# Patient Record
Sex: FEMALE | Race: WHITE | NOT HISPANIC OR LATINO | Employment: UNEMPLOYED | URBAN - METROPOLITAN AREA
[De-identification: names, ages, dates, MRNs, and addresses within clinical notes are randomized per-mention and may not be internally consistent; named-entity substitution may affect disease eponyms.]

---

## 2019-01-01 ENCOUNTER — INITIAL CONSULT (OUTPATIENT)
Dept: HEMATOLOGY/ONCOLOGY | Facility: CLINIC | Age: 79
End: 2019-01-01
Payer: MEDICARE

## 2019-01-01 ENCOUNTER — HOSPITAL ENCOUNTER (INPATIENT)
Facility: HOSPITAL | Age: 79
LOS: 5 days | Discharge: HOME HEALTH | DRG: 025 | End: 2019-03-13
Attending: EMERGENCY MEDICINE | Admitting: NEUROLOGICAL SURGERY
Payer: MEDICARE

## 2019-01-01 ENCOUNTER — TELEPHONE (OUTPATIENT)
Dept: NEUROSURGERY | Facility: CLINIC | Age: 79
End: 2019-01-01

## 2019-01-01 ENCOUNTER — DOCUMENTATION ONLY (OUTPATIENT)
Dept: RESEARCH | Facility: HOSPITAL | Age: 79
End: 2019-01-01

## 2019-01-01 ENCOUNTER — NURSE TRIAGE (OUTPATIENT)
Dept: ADMINISTRATIVE | Facility: CLINIC | Age: 79
End: 2019-01-01

## 2019-01-01 ENCOUNTER — TELEPHONE (OUTPATIENT)
Dept: ADMINISTRATIVE | Facility: CLINIC | Age: 79
End: 2019-01-01

## 2019-01-01 ENCOUNTER — INITIAL CONSULT (OUTPATIENT)
Dept: RADIATION ONCOLOGY | Facility: CLINIC | Age: 79
End: 2019-01-01
Payer: MEDICARE

## 2019-01-01 ENCOUNTER — ANESTHESIA EVENT (OUTPATIENT)
Dept: SURGERY | Facility: HOSPITAL | Age: 79
DRG: 025 | End: 2019-01-01
Payer: MEDICARE

## 2019-01-01 ENCOUNTER — PATIENT OUTREACH (OUTPATIENT)
Dept: ADMINISTRATIVE | Facility: CLINIC | Age: 79
End: 2019-01-01

## 2019-01-01 ENCOUNTER — ANESTHESIA (OUTPATIENT)
Dept: SURGERY | Facility: HOSPITAL | Age: 79
DRG: 025 | End: 2019-01-01
Payer: MEDICARE

## 2019-01-01 ENCOUNTER — OFFICE VISIT (OUTPATIENT)
Dept: NEUROSURGERY | Facility: CLINIC | Age: 79
End: 2019-01-01
Payer: MEDICARE

## 2019-01-01 VITALS
BODY MASS INDEX: 33.68 KG/M2 | DIASTOLIC BLOOD PRESSURE: 68 MMHG | HEIGHT: 62 IN | HEART RATE: 68 BPM | WEIGHT: 183 LBS | SYSTOLIC BLOOD PRESSURE: 120 MMHG

## 2019-01-01 VITALS
DIASTOLIC BLOOD PRESSURE: 83 MMHG | HEART RATE: 69 BPM | WEIGHT: 183.63 LBS | RESPIRATION RATE: 18 BRPM | BODY MASS INDEX: 33.79 KG/M2 | OXYGEN SATURATION: 99 % | SYSTOLIC BLOOD PRESSURE: 142 MMHG | TEMPERATURE: 97 F | HEIGHT: 62 IN

## 2019-01-01 VITALS
WEIGHT: 183 LBS | RESPIRATION RATE: 18 BRPM | BODY MASS INDEX: 33.68 KG/M2 | DIASTOLIC BLOOD PRESSURE: 68 MMHG | SYSTOLIC BLOOD PRESSURE: 120 MMHG | HEART RATE: 68 BPM | HEIGHT: 62 IN

## 2019-01-01 DIAGNOSIS — E03.9 HYPOTHYROIDISM, UNSPECIFIED TYPE: ICD-10-CM

## 2019-01-01 DIAGNOSIS — R73.9 HYPERGLYCEMIA: ICD-10-CM

## 2019-01-01 DIAGNOSIS — G93.89 BRAIN MASS: Primary | ICD-10-CM

## 2019-01-01 DIAGNOSIS — C71.9 GBM (GLIOBLASTOMA MULTIFORME): ICD-10-CM

## 2019-01-01 DIAGNOSIS — C71.1 PRIMARY MALIGNANT NEOPLASM OF FRONTAL LOBE: Primary | ICD-10-CM

## 2019-01-01 DIAGNOSIS — I10 HYPERTENSION, UNSPECIFIED TYPE: ICD-10-CM

## 2019-01-01 DIAGNOSIS — I48.91 A-FIB: ICD-10-CM

## 2019-01-01 DIAGNOSIS — C71.9 GLIOBLASTOMA MULTIFORME: Primary | ICD-10-CM

## 2019-01-01 DIAGNOSIS — Z98.890 S/P CRANIOTOMY: ICD-10-CM

## 2019-01-01 DIAGNOSIS — R41.82 ALTERED MENTAL STATUS, UNSPECIFIED ALTERED MENTAL STATUS TYPE: ICD-10-CM

## 2019-01-01 LAB
ABO + RH BLD: NORMAL
ALBUMIN SERPL BCP-MCNC: 3.1 G/DL
ALBUMIN SERPL BCP-MCNC: 3.3 G/DL
ALP SERPL-CCNC: 43 U/L
ALP SERPL-CCNC: 43 U/L
ALT SERPL W/O P-5'-P-CCNC: 42 U/L
ALT SERPL W/O P-5'-P-CCNC: 59 U/L
ANION GAP SERPL CALC-SCNC: 11 MMOL/L
ANION GAP SERPL CALC-SCNC: 11 MMOL/L
ANION GAP SERPL CALC-SCNC: 12 MMOL/L
ANION GAP SERPL CALC-SCNC: 13 MMOL/L
ANION GAP SERPL CALC-SCNC: 8 MMOL/L
ANION GAP SERPL CALC-SCNC: 9 MMOL/L
ANION GAP SERPL CALC-SCNC: 9 MMOL/L
APTT BLDCRRT: 22 SEC
APTT BLDCRRT: 24 SEC
AST SERPL-CCNC: 20 U/L
AST SERPL-CCNC: 43 U/L
BASOPHILS # BLD AUTO: 0.01 K/UL
BASOPHILS # BLD AUTO: 0.03 K/UL
BASOPHILS # BLD AUTO: 0.04 K/UL
BASOPHILS # BLD AUTO: 0.05 K/UL
BASOPHILS # BLD AUTO: 0.05 K/UL
BASOPHILS # BLD AUTO: 0.06 K/UL
BASOPHILS NFR BLD: 0.1 %
BASOPHILS NFR BLD: 0.2 %
BASOPHILS NFR BLD: 0.4 %
BASOPHILS NFR BLD: 0.6 %
BASOPHILS NFR BLD: 0.6 %
BASOPHILS NFR BLD: 0.7 %
BILIRUB SERPL-MCNC: 0.3 MG/DL
BILIRUB SERPL-MCNC: 0.4 MG/DL
BILIRUB UR QL STRIP: NEGATIVE
BILIRUB UR QL STRIP: NEGATIVE
BLD GP AB SCN CELLS X3 SERPL QL: NORMAL
BLD PROD TYP BPU: NORMAL
BLD PROD TYP BPU: NORMAL
BLOOD UNIT EXPIRATION DATE: NORMAL
BLOOD UNIT EXPIRATION DATE: NORMAL
BLOOD UNIT TYPE CODE: 600
BLOOD UNIT TYPE CODE: 600
BLOOD UNIT TYPE: NORMAL
BLOOD UNIT TYPE: NORMAL
BUN SERPL-MCNC: 24 MG/DL
BUN SERPL-MCNC: 29 MG/DL
BUN SERPL-MCNC: 30 MG/DL
BUN SERPL-MCNC: 30 MG/DL
BUN SERPL-MCNC: 31 MG/DL
BUN SERPL-MCNC: 32 MG/DL
BUN SERPL-MCNC: 41 MG/DL
CALCIUM SERPL-MCNC: 10 MG/DL
CALCIUM SERPL-MCNC: 10.3 MG/DL
CALCIUM SERPL-MCNC: 10.5 MG/DL
CALCIUM SERPL-MCNC: 9.3 MG/DL
CALCIUM SERPL-MCNC: 9.3 MG/DL
CHLORIDE SERPL-SCNC: 100 MMOL/L
CHLORIDE SERPL-SCNC: 101 MMOL/L
CHLORIDE SERPL-SCNC: 102 MMOL/L
CHLORIDE SERPL-SCNC: 104 MMOL/L
CHLORIDE SERPL-SCNC: 104 MMOL/L
CHLORIDE SERPL-SCNC: 106 MMOL/L
CHLORIDE SERPL-SCNC: 106 MMOL/L
CLARITY UR REFRACT.AUTO: CLEAR
CLARITY UR REFRACT.AUTO: CLEAR
CO2 SERPL-SCNC: 21 MMOL/L
CO2 SERPL-SCNC: 22 MMOL/L
CO2 SERPL-SCNC: 22 MMOL/L
CO2 SERPL-SCNC: 24 MMOL/L
CO2 SERPL-SCNC: 25 MMOL/L
CO2 SERPL-SCNC: 25 MMOL/L
CO2 SERPL-SCNC: 26 MMOL/L
CODING SYSTEM: NORMAL
CODING SYSTEM: NORMAL
COLOR UR AUTO: ABNORMAL
COLOR UR AUTO: YELLOW
CREAT SERPL-MCNC: 1.2 MG/DL
CREAT SERPL-MCNC: 1.2 MG/DL
CREAT SERPL-MCNC: 1.3 MG/DL
CREAT SERPL-MCNC: 1.3 MG/DL
CREAT SERPL-MCNC: 1.4 MG/DL
CREAT SERPL-MCNC: 1.4 MG/DL
CREAT SERPL-MCNC: 1.5 MG/DL
DIFFERENTIAL METHOD: ABNORMAL
DISPENSE STATUS: NORMAL
DISPENSE STATUS: NORMAL
EOSINOPHIL # BLD AUTO: 0 K/UL
EOSINOPHIL # BLD AUTO: 0 K/UL
EOSINOPHIL # BLD AUTO: 0.1 K/UL
EOSINOPHIL NFR BLD: 0 %
EOSINOPHIL NFR BLD: 0 %
EOSINOPHIL NFR BLD: 0.7 %
EOSINOPHIL NFR BLD: 0.8 %
EOSINOPHIL NFR BLD: 1.2 %
EOSINOPHIL NFR BLD: 1.3 %
ERYTHROCYTE [DISTWIDTH] IN BLOOD BY AUTOMATED COUNT: 12.2 %
ERYTHROCYTE [DISTWIDTH] IN BLOOD BY AUTOMATED COUNT: 12.3 %
ERYTHROCYTE [DISTWIDTH] IN BLOOD BY AUTOMATED COUNT: 12.5 %
ERYTHROCYTE [DISTWIDTH] IN BLOOD BY AUTOMATED COUNT: 12.8 %
EST. GFR  (AFRICAN AMERICAN): 38.5 ML/MIN/1.73 M^2
EST. GFR  (AFRICAN AMERICAN): 41.8 ML/MIN/1.73 M^2
EST. GFR  (AFRICAN AMERICAN): 41.8 ML/MIN/1.73 M^2
EST. GFR  (AFRICAN AMERICAN): 45.7 ML/MIN/1.73 M^2
EST. GFR  (AFRICAN AMERICAN): 45.7 ML/MIN/1.73 M^2
EST. GFR  (AFRICAN AMERICAN): 50.4 ML/MIN/1.73 M^2
EST. GFR  (AFRICAN AMERICAN): 50.4 ML/MIN/1.73 M^2
EST. GFR  (NON AFRICAN AMERICAN): 33.4 ML/MIN/1.73 M^2
EST. GFR  (NON AFRICAN AMERICAN): 36.3 ML/MIN/1.73 M^2
EST. GFR  (NON AFRICAN AMERICAN): 36.3 ML/MIN/1.73 M^2
EST. GFR  (NON AFRICAN AMERICAN): 39.7 ML/MIN/1.73 M^2
EST. GFR  (NON AFRICAN AMERICAN): 39.7 ML/MIN/1.73 M^2
EST. GFR  (NON AFRICAN AMERICAN): 43.7 ML/MIN/1.73 M^2
EST. GFR  (NON AFRICAN AMERICAN): 43.7 ML/MIN/1.73 M^2
ESTIMATED AVG GLUCOSE: 131 MG/DL
GLUCOSE SERPL-MCNC: 145 MG/DL
GLUCOSE SERPL-MCNC: 153 MG/DL
GLUCOSE SERPL-MCNC: 157 MG/DL
GLUCOSE SERPL-MCNC: 193 MG/DL
GLUCOSE SERPL-MCNC: 197 MG/DL
GLUCOSE SERPL-MCNC: 197 MG/DL
GLUCOSE SERPL-MCNC: 217 MG/DL
GLUCOSE UR QL STRIP: ABNORMAL
GLUCOSE UR QL STRIP: ABNORMAL
HBA1C MFR BLD HPLC: 6.2 %
HCT VFR BLD AUTO: 33.6 %
HCT VFR BLD AUTO: 34.7 %
HCT VFR BLD AUTO: 34.8 %
HCT VFR BLD AUTO: 34.9 %
HCT VFR BLD AUTO: 35.3 %
HCT VFR BLD AUTO: 35.5 %
HGB BLD-MCNC: 10.6 G/DL
HGB BLD-MCNC: 11 G/DL
HGB BLD-MCNC: 11.1 G/DL
HGB BLD-MCNC: 11.3 G/DL
HGB BLD-MCNC: 11.5 G/DL
HGB BLD-MCNC: 11.8 G/DL
HGB UR QL STRIP: ABNORMAL
HGB UR QL STRIP: NEGATIVE
IMM GRANULOCYTES # BLD AUTO: 0.02 K/UL
IMM GRANULOCYTES # BLD AUTO: 0.04 K/UL
IMM GRANULOCYTES # BLD AUTO: 0.04 K/UL
IMM GRANULOCYTES # BLD AUTO: 0.09 K/UL
IMM GRANULOCYTES NFR BLD AUTO: 0.2 %
IMM GRANULOCYTES NFR BLD AUTO: 0.4 %
IMM GRANULOCYTES NFR BLD AUTO: 0.4 %
IMM GRANULOCYTES NFR BLD AUTO: 0.6 %
INR PPP: 1
INR PPP: 1.1
KETONES UR QL STRIP: NEGATIVE
KETONES UR QL STRIP: NEGATIVE
LEUKOCYTE ESTERASE UR QL STRIP: NEGATIVE
LEUKOCYTE ESTERASE UR QL STRIP: NEGATIVE
LYMPHOCYTES # BLD AUTO: 1.2 K/UL
LYMPHOCYTES # BLD AUTO: 2 K/UL
LYMPHOCYTES # BLD AUTO: 2.9 K/UL
LYMPHOCYTES # BLD AUTO: 3.1 K/UL
LYMPHOCYTES # BLD AUTO: 3.4 K/UL
LYMPHOCYTES # BLD AUTO: 3.6 K/UL
LYMPHOCYTES NFR BLD: 10.8 %
LYMPHOCYTES NFR BLD: 12.9 %
LYMPHOCYTES NFR BLD: 28.5 %
LYMPHOCYTES NFR BLD: 37.1 %
LYMPHOCYTES NFR BLD: 37.6 %
LYMPHOCYTES NFR BLD: 39.5 %
MAGNESIUM SERPL-MCNC: 1.4 MG/DL
MAGNESIUM SERPL-MCNC: 1.7 MG/DL
MCH RBC QN AUTO: 31.1 PG
MCH RBC QN AUTO: 31.2 PG
MCH RBC QN AUTO: 31.2 PG
MCH RBC QN AUTO: 31.5 PG
MCH RBC QN AUTO: 31.5 PG
MCH RBC QN AUTO: 31.6 PG
MCHC RBC AUTO-ENTMCNC: 31.5 G/DL
MCHC RBC AUTO-ENTMCNC: 31.6 G/DL
MCHC RBC AUTO-ENTMCNC: 31.8 G/DL
MCHC RBC AUTO-ENTMCNC: 32.6 G/DL
MCHC RBC AUTO-ENTMCNC: 32.6 G/DL
MCHC RBC AUTO-ENTMCNC: 33.2 G/DL
MCV RBC AUTO: 95 FL
MCV RBC AUTO: 96 FL
MCV RBC AUTO: 97 FL
MCV RBC AUTO: 99 FL
MICROSCOPIC COMMENT: NORMAL
MONOCYTES # BLD AUTO: 0.3 K/UL
MONOCYTES # BLD AUTO: 0.6 K/UL
MONOCYTES # BLD AUTO: 0.7 K/UL
MONOCYTES # BLD AUTO: 0.7 K/UL
MONOCYTES # BLD AUTO: 0.8 K/UL
MONOCYTES # BLD AUTO: 1 K/UL
MONOCYTES NFR BLD: 2.6 %
MONOCYTES NFR BLD: 6.6 %
MONOCYTES NFR BLD: 6.8 %
MONOCYTES NFR BLD: 7.8 %
MONOCYTES NFR BLD: 8.3 %
MONOCYTES NFR BLD: 8.6 %
NEUTROPHILS # BLD AUTO: 12.6 K/UL
NEUTROPHILS # BLD AUTO: 4.4 K/UL
NEUTROPHILS # BLD AUTO: 4.6 K/UL
NEUTROPHILS # BLD AUTO: 4.9 K/UL
NEUTROPHILS # BLD AUTO: 6.2 K/UL
NEUTROPHILS # BLD AUTO: 9.5 K/UL
NEUTROPHILS NFR BLD: 50.7 %
NEUTROPHILS NFR BLD: 52.6 %
NEUTROPHILS NFR BLD: 53.5 %
NEUTROPHILS NFR BLD: 61.7 %
NEUTROPHILS NFR BLD: 79.7 %
NEUTROPHILS NFR BLD: 86.1 %
NITRITE UR QL STRIP: NEGATIVE
NITRITE UR QL STRIP: NEGATIVE
NRBC BLD-RTO: 0 /100 WBC
PH UR STRIP: 5 [PH] (ref 5–8)
PH UR STRIP: 5 [PH] (ref 5–8)
PHOSPHATE SERPL-MCNC: 2.8 MG/DL
PHOSPHATE SERPL-MCNC: 3.3 MG/DL
PLATELET # BLD AUTO: 254 K/UL
PLATELET # BLD AUTO: 266 K/UL
PLATELET # BLD AUTO: 275 K/UL
PLATELET # BLD AUTO: 280 K/UL
PLATELET # BLD AUTO: 282 K/UL
PLATELET # BLD AUTO: 284 K/UL
PMV BLD AUTO: 10.2 FL
PMV BLD AUTO: 10.3 FL
PMV BLD AUTO: 10.6 FL
PMV BLD AUTO: 10.6 FL
PMV BLD AUTO: 10.7 FL
PMV BLD AUTO: 10.9 FL
POCT GLUCOSE: 125 MG/DL (ref 70–110)
POCT GLUCOSE: 166 MG/DL (ref 70–110)
POCT GLUCOSE: 195 MG/DL (ref 70–110)
POCT GLUCOSE: 206 MG/DL (ref 70–110)
POCT GLUCOSE: 255 MG/DL (ref 70–110)
POCT GLUCOSE: 280 MG/DL (ref 70–110)
POTASSIUM SERPL-SCNC: 3.9 MMOL/L
POTASSIUM SERPL-SCNC: 3.9 MMOL/L
POTASSIUM SERPL-SCNC: 4.1 MMOL/L
POTASSIUM SERPL-SCNC: 4.2 MMOL/L
POTASSIUM SERPL-SCNC: 4.8 MMOL/L
POTASSIUM SERPL-SCNC: 4.9 MMOL/L
POTASSIUM SERPL-SCNC: 4.9 MMOL/L
PROT SERPL-MCNC: 7.2 G/DL
PROT SERPL-MCNC: 7.6 G/DL
PROT UR QL STRIP: NEGATIVE
PROT UR QL STRIP: NEGATIVE
PROTHROMBIN TIME: 10.7 SEC
PROTHROMBIN TIME: 11.7 SEC
RBC # BLD AUTO: 3.41 M/UL
RBC # BLD AUTO: 3.51 M/UL
RBC # BLD AUTO: 3.53 M/UL
RBC # BLD AUTO: 3.62 M/UL
RBC # BLD AUTO: 3.65 M/UL
RBC # BLD AUTO: 3.75 M/UL
RBC #/AREA URNS AUTO: 3 /HPF (ref 0–4)
SODIUM SERPL-SCNC: 136 MMOL/L
SODIUM SERPL-SCNC: 137 MMOL/L
SODIUM SERPL-SCNC: 138 MMOL/L
SP GR UR STRIP: 1.02 (ref 1–1.03)
SP GR UR STRIP: 1.02 (ref 1–1.03)
SQUAMOUS #/AREA URNS AUTO: 1 /HPF
TRANS ERYTHROCYTES VOL PATIENT: NORMAL ML
TRANS ERYTHROCYTES VOL PATIENT: NORMAL ML
URN SPEC COLLECT METH UR: ABNORMAL
URN SPEC COLLECT METH UR: ABNORMAL
WBC # BLD AUTO: 10.06 K/UL
WBC # BLD AUTO: 10.97 K/UL
WBC # BLD AUTO: 15.79 K/UL
WBC # BLD AUTO: 8.38 K/UL
WBC # BLD AUTO: 8.99 K/UL
WBC # BLD AUTO: 9.07 K/UL
WBC #/AREA URNS AUTO: 2 /HPF (ref 0–5)

## 2019-01-01 PROCEDURE — 63600175 PHARM REV CODE 636 W HCPCS: Performed by: PHYSICIAN ASSISTANT

## 2019-01-01 PROCEDURE — 63600175 PHARM REV CODE 636 W HCPCS: Performed by: NURSE ANESTHETIST, CERTIFIED REGISTERED

## 2019-01-01 PROCEDURE — 97535 SELF CARE MNGMENT TRAINING: CPT

## 2019-01-01 PROCEDURE — 27201423 OPTIME MED/SURG SUP & DEVICES STERILE SUPPLY: Performed by: NEUROLOGICAL SURGERY

## 2019-01-01 PROCEDURE — 25500020 PHARM REV CODE 255: Performed by: NEUROLOGICAL SURGERY

## 2019-01-01 PROCEDURE — 92523 SPEECH SOUND LANG COMPREHEN: CPT

## 2019-01-01 PROCEDURE — 36000712 HC OR TIME LEV V 1ST 15 MIN: Performed by: NEUROLOGICAL SURGERY

## 2019-01-01 PROCEDURE — D9220A PRA ANESTHESIA: ICD-10-PCS | Mod: CRNA,,, | Performed by: NURSE ANESTHETIST, CERTIFIED REGISTERED

## 2019-01-01 PROCEDURE — 99999 PR PBB SHADOW E&M-EST. PATIENT-LVL III: CPT | Mod: PBBFAC,,, | Performed by: NEUROLOGICAL SURGERY

## 2019-01-01 PROCEDURE — 61510 PR EXCIS SUPRATENT BRAIN TUMOR: ICD-10-PCS | Mod: ,,, | Performed by: NEUROLOGICAL SURGERY

## 2019-01-01 PROCEDURE — 99024 POSTOP FOLLOW-UP VISIT: CPT | Mod: POP,,, | Performed by: PHYSICIAN ASSISTANT

## 2019-01-01 PROCEDURE — 25000003 PHARM REV CODE 250: Performed by: PHYSICIAN ASSISTANT

## 2019-01-01 PROCEDURE — 85730 THROMBOPLASTIN TIME PARTIAL: CPT

## 2019-01-01 PROCEDURE — 85610 PROTHROMBIN TIME: CPT

## 2019-01-01 PROCEDURE — 93010 ELECTROCARDIOGRAM REPORT: CPT | Mod: ,,, | Performed by: INTERNAL MEDICINE

## 2019-01-01 PROCEDURE — A9585 GADOBUTROL INJECTION: HCPCS | Performed by: NEUROLOGICAL SURGERY

## 2019-01-01 PROCEDURE — 99024 PR POST-OP FOLLOW-UP VISIT: ICD-10-PCS | Mod: POP,,, | Performed by: NEUROLOGICAL SURGERY

## 2019-01-01 PROCEDURE — 36000711: Performed by: NEUROLOGICAL SURGERY

## 2019-01-01 PROCEDURE — 36000713 HC OR TIME LEV V EA ADD 15 MIN: Performed by: NEUROLOGICAL SURGERY

## 2019-01-01 PROCEDURE — 81001 URINALYSIS AUTO W/SCOPE: CPT

## 2019-01-01 PROCEDURE — 83735 ASSAY OF MAGNESIUM: CPT

## 2019-01-01 PROCEDURE — 99285 EMERGENCY DEPT VISIT HI MDM: CPT | Mod: 25

## 2019-01-01 PROCEDURE — 88307 TISSUE EXAM BY PATHOLOGIST: CPT | Performed by: PATHOLOGY

## 2019-01-01 PROCEDURE — 99205 PR OFFICE/OUTPT VISIT, NEW, LEVL V, 60-74 MIN: ICD-10-PCS | Mod: S$PBB,,, | Performed by: RADIOLOGY

## 2019-01-01 PROCEDURE — 99213 OFFICE O/P EST LOW 20 MIN: CPT | Mod: PBBFAC,27 | Performed by: NEUROLOGICAL SURGERY

## 2019-01-01 PROCEDURE — 97161 PT EVAL LOW COMPLEX 20 MIN: CPT

## 2019-01-01 PROCEDURE — 86901 BLOOD TYPING SEROLOGIC RH(D): CPT

## 2019-01-01 PROCEDURE — 99233 SBSQ HOSP IP/OBS HIGH 50: CPT | Mod: ,,, | Performed by: PHYSICIAN ASSISTANT

## 2019-01-01 PROCEDURE — 36415 COLL VENOUS BLD VENIPUNCTURE: CPT

## 2019-01-01 PROCEDURE — 99205 PR OFFICE/OUTPT VISIT, NEW, LEVL V, 60-74 MIN: ICD-10-PCS | Mod: S$PBB,,, | Performed by: INTERNAL MEDICINE

## 2019-01-01 PROCEDURE — 25000003 PHARM REV CODE 250: Performed by: NEUROLOGICAL SURGERY

## 2019-01-01 PROCEDURE — C1713 ANCHOR/SCREW BN/BN,TIS/BN: HCPCS | Performed by: NEUROLOGICAL SURGERY

## 2019-01-01 PROCEDURE — 99024 POSTOP FOLLOW-UP VISIT: CPT | Mod: POP,,, | Performed by: NEUROLOGICAL SURGERY

## 2019-01-01 PROCEDURE — 84100 ASSAY OF PHOSPHORUS: CPT

## 2019-01-01 PROCEDURE — 25000003 PHARM REV CODE 250: Performed by: STUDENT IN AN ORGANIZED HEALTH CARE EDUCATION/TRAINING PROGRAM

## 2019-01-01 PROCEDURE — 94761 N-INVAS EAR/PLS OXIMETRY MLT: CPT

## 2019-01-01 PROCEDURE — 80053 COMPREHEN METABOLIC PANEL: CPT

## 2019-01-01 PROCEDURE — 99999 PR PBB SHADOW E&M-EST. PATIENT-LVL III: ICD-10-PCS | Mod: PBBFAC,,, | Performed by: NEUROLOGICAL SURGERY

## 2019-01-01 PROCEDURE — 80048 BASIC METABOLIC PNL TOTAL CA: CPT

## 2019-01-01 PROCEDURE — 85025 COMPLETE CBC W/AUTO DIFF WBC: CPT

## 2019-01-01 PROCEDURE — 99024 PR POST-OP FOLLOW-UP VISIT: ICD-10-PCS | Mod: POP,,, | Performed by: PHYSICIAN ASSISTANT

## 2019-01-01 PROCEDURE — 99222 1ST HOSP IP/OBS MODERATE 55: CPT | Mod: ,,, | Performed by: PHYSICIAN ASSISTANT

## 2019-01-01 PROCEDURE — 25000003 PHARM REV CODE 250: Performed by: EMERGENCY MEDICINE

## 2019-01-01 PROCEDURE — 20600001 HC STEP DOWN PRIVATE ROOM

## 2019-01-01 PROCEDURE — 27800903 OPTIME MED/SURG SUP & DEVICES OTHER IMPLANTS: Performed by: NEUROLOGICAL SURGERY

## 2019-01-01 PROCEDURE — 82962 GLUCOSE BLOOD TEST: CPT | Performed by: NEUROLOGICAL SURGERY

## 2019-01-01 PROCEDURE — 36000710: Performed by: NEUROLOGICAL SURGERY

## 2019-01-01 PROCEDURE — 99291 PR CRITICAL CARE, E/M 30-74 MINUTES: ICD-10-PCS | Mod: ,,, | Performed by: EMERGENCY MEDICINE

## 2019-01-01 PROCEDURE — 63600175 PHARM REV CODE 636 W HCPCS: Performed by: NEUROLOGICAL SURGERY

## 2019-01-01 PROCEDURE — D9220A PRA ANESTHESIA: Mod: CRNA,,, | Performed by: NURSE ANESTHETIST, CERTIFIED REGISTERED

## 2019-01-01 PROCEDURE — 25000003 PHARM REV CODE 250: Performed by: NURSE ANESTHETIST, CERTIFIED REGISTERED

## 2019-01-01 PROCEDURE — 96365 THER/PROPH/DIAG IV INF INIT: CPT

## 2019-01-01 PROCEDURE — 12000002 HC ACUTE/MED SURGE SEMI-PRIVATE ROOM

## 2019-01-01 PROCEDURE — 88307 TISSUE SPECIMEN TO PATHOLOGY - SURGERY: ICD-10-PCS | Mod: 26,,, | Performed by: PATHOLOGY

## 2019-01-01 PROCEDURE — 86920 COMPATIBILITY TEST SPIN: CPT

## 2019-01-01 PROCEDURE — D9220A PRA ANESTHESIA: ICD-10-PCS | Mod: ANES,,, | Performed by: ANESTHESIOLOGY

## 2019-01-01 PROCEDURE — 99233 PR SUBSEQUENT HOSPITAL CARE,LEVL III: ICD-10-PCS | Mod: ,,, | Performed by: PHYSICIAN ASSISTANT

## 2019-01-01 PROCEDURE — 61781 PR STEREOTACTIC COMP ASSIST PROC,CRANIAL,INTRADURAL: ICD-10-PCS | Mod: ,,, | Performed by: NEUROLOGICAL SURGERY

## 2019-01-01 PROCEDURE — 99999 PR PBB SHADOW E&M-EST. PATIENT-LVL III: ICD-10-PCS | Mod: PBBFAC,,, | Performed by: RADIOLOGY

## 2019-01-01 PROCEDURE — 93010 EKG 12-LEAD: ICD-10-PCS | Mod: ,,, | Performed by: INTERNAL MEDICINE

## 2019-01-01 PROCEDURE — 20000000 HC ICU ROOM

## 2019-01-01 PROCEDURE — 80047 BASIC METABLC PNL IONIZED CA: CPT

## 2019-01-01 PROCEDURE — 99221 PR INITIAL HOSPITAL CARE,LEVL I: ICD-10-PCS | Mod: ,,, | Performed by: HOSPITALIST

## 2019-01-01 PROCEDURE — 99223 PR INITIAL HOSPITAL CARE,LEVL III: ICD-10-PCS | Mod: ,,, | Performed by: PHYSICIAN ASSISTANT

## 2019-01-01 PROCEDURE — 99205 OFFICE O/P NEW HI 60 MIN: CPT | Mod: S$PBB,,, | Performed by: RADIOLOGY

## 2019-01-01 PROCEDURE — 99222 PR INITIAL HOSPITAL CARE,LEVL II: ICD-10-PCS | Mod: ,,, | Performed by: PHYSICIAN ASSISTANT

## 2019-01-01 PROCEDURE — 99223 1ST HOSP IP/OBS HIGH 75: CPT | Mod: ,,, | Performed by: PHYSICIAN ASSISTANT

## 2019-01-01 PROCEDURE — 25500020 PHARM REV CODE 255: Performed by: FAMILY MEDICINE

## 2019-01-01 PROCEDURE — 99221 1ST HOSP IP/OBS SF/LOW 40: CPT | Mod: ,,, | Performed by: HOSPITALIST

## 2019-01-01 PROCEDURE — 61781 SCAN PROC CRANIAL INTRA: CPT | Mod: ,,, | Performed by: NEUROLOGICAL SURGERY

## 2019-01-01 PROCEDURE — 93005 ELECTROCARDIOGRAM TRACING: CPT

## 2019-01-01 PROCEDURE — 37000009 HC ANESTHESIA EA ADD 15 MINS: Performed by: NEUROLOGICAL SURGERY

## 2019-01-01 PROCEDURE — D9220A PRA ANESTHESIA: Mod: ANES,,, | Performed by: ANESTHESIOLOGY

## 2019-01-01 PROCEDURE — 83036 HEMOGLOBIN GLYCOSYLATED A1C: CPT

## 2019-01-01 PROCEDURE — 99999 PR PBB SHADOW E&M-EST. PATIENT-LVL III: CPT | Mod: PBBFAC,,, | Performed by: RADIOLOGY

## 2019-01-01 PROCEDURE — 81003 URINALYSIS AUTO W/O SCOPE: CPT

## 2019-01-01 PROCEDURE — 99213 OFFICE O/P EST LOW 20 MIN: CPT | Mod: PBBFAC | Performed by: RADIOLOGY

## 2019-01-01 PROCEDURE — 99291 CRITICAL CARE FIRST HOUR: CPT | Mod: ,,, | Performed by: EMERGENCY MEDICINE

## 2019-01-01 PROCEDURE — 88307 TISSUE EXAM BY PATHOLOGIST: CPT | Mod: 26,,, | Performed by: PATHOLOGY

## 2019-01-01 PROCEDURE — 37000008 HC ANESTHESIA 1ST 15 MINUTES: Performed by: NEUROLOGICAL SURGERY

## 2019-01-01 PROCEDURE — 61510 CRNEC TREPH EXC BRN TUM STTL: CPT | Mod: ,,, | Performed by: NEUROLOGICAL SURGERY

## 2019-01-01 PROCEDURE — 99205 OFFICE O/P NEW HI 60 MIN: CPT | Mod: S$PBB,,, | Performed by: INTERNAL MEDICINE

## 2019-01-01 DEVICE — DURAMATRIX ONLAY 2X2 MEMBRANE: Type: IMPLANTABLE DEVICE | Site: CRANIAL | Status: FUNCTIONAL

## 2019-01-01 DEVICE — PLATE BONE BUR HOLE COVER 10MM: Type: IMPLANTABLE DEVICE | Site: CRANIAL | Status: FUNCTIONAL

## 2019-01-01 DEVICE — PLATE BONE 2X2 HOLE SM BOX: Type: IMPLANTABLE DEVICE | Site: CRANIAL | Status: FUNCTIONAL

## 2019-01-01 DEVICE — PLATE BONE RIGID 2HOLE .6X12MM: Type: IMPLANTABLE DEVICE | Site: CRANIAL | Status: FUNCTIONAL

## 2019-01-01 DEVICE — IMPLANTABLE DEVICE: Type: IMPLANTABLE DEVICE | Site: CRANIAL | Status: FUNCTIONAL

## 2019-01-01 RX ORDER — INSULIN ASPART 100 [IU]/ML
3 INJECTION, SOLUTION INTRAVENOUS; SUBCUTANEOUS
Status: DISCONTINUED | OUTPATIENT
Start: 2019-01-01 | End: 2019-01-01 | Stop reason: HOSPADM

## 2019-01-01 RX ORDER — LEVOTHYROXINE SODIUM 75 UG/1
75 TABLET ORAL DAILY
COMMUNITY

## 2019-01-01 RX ORDER — PROPOFOL 10 MG/ML
VIAL (ML) INTRAVENOUS
Status: DISCONTINUED | OUTPATIENT
Start: 2019-01-01 | End: 2019-01-01

## 2019-01-01 RX ORDER — LISINOPRIL AND HYDROCHLOROTHIAZIDE 10; 12.5 MG/1; MG/1
1 TABLET ORAL DAILY
Status: ON HOLD | COMMUNITY
End: 2019-01-01 | Stop reason: HOSPADM

## 2019-01-01 RX ORDER — MUPIROCIN 20 MG/G
1 OINTMENT TOPICAL 2 TIMES DAILY
Status: DISCONTINUED | OUTPATIENT
Start: 2019-01-01 | End: 2019-01-01 | Stop reason: HOSPADM

## 2019-01-01 RX ORDER — DEXAMETHASONE 2 MG/1
TABLET ORAL
Qty: 45 TABLET | Refills: 2 | Status: SHIPPED | OUTPATIENT
Start: 2019-01-01 | End: 2019-01-01 | Stop reason: SDUPTHER

## 2019-01-01 RX ORDER — AMOXICILLIN 250 MG
2 CAPSULE ORAL DAILY
Status: DISCONTINUED | OUTPATIENT
Start: 2019-01-01 | End: 2019-01-01

## 2019-01-01 RX ORDER — IBUPROFEN 200 MG
16 TABLET ORAL
Status: DISCONTINUED | OUTPATIENT
Start: 2019-01-01 | End: 2019-01-01 | Stop reason: HOSPADM

## 2019-01-01 RX ORDER — FAMOTIDINE 20 MG/1
20 TABLET, FILM COATED ORAL DAILY
Status: DISCONTINUED | OUTPATIENT
Start: 2019-01-01 | End: 2019-01-01 | Stop reason: HOSPADM

## 2019-01-01 RX ORDER — METOPROLOL SUCCINATE 25 MG/1
25 TABLET, EXTENDED RELEASE ORAL DAILY
Status: DISCONTINUED | OUTPATIENT
Start: 2019-01-01 | End: 2019-01-01

## 2019-01-01 RX ORDER — KETAMINE HCL IN 0.9 % NACL 50 MG/5 ML
SYRINGE (ML) INTRAVENOUS
Status: DISCONTINUED | OUTPATIENT
Start: 2019-01-01 | End: 2019-01-01

## 2019-01-01 RX ORDER — LEVOTHYROXINE SODIUM 75 UG/1
75 TABLET ORAL DAILY
Status: DISCONTINUED | OUTPATIENT
Start: 2019-01-01 | End: 2019-01-01

## 2019-01-01 RX ORDER — HYDROCODONE BITARTRATE AND ACETAMINOPHEN 5; 325 MG/1; MG/1
1 TABLET ORAL EVERY 4 HOURS PRN
Status: DISCONTINUED | OUTPATIENT
Start: 2019-01-01 | End: 2019-01-01 | Stop reason: HOSPADM

## 2019-01-01 RX ORDER — LIDOCAINE HYDROCHLORIDE AND EPINEPHRINE 10; 10 MG/ML; UG/ML
INJECTION, SOLUTION INFILTRATION; PERINEURAL
Status: DISCONTINUED | OUTPATIENT
Start: 2019-01-01 | End: 2019-01-01 | Stop reason: HOSPADM

## 2019-01-01 RX ORDER — DEXAMETHASONE 4 MG/1
4 TABLET ORAL EVERY 6 HOURS
Status: DISCONTINUED | OUTPATIENT
Start: 2019-01-01 | End: 2019-01-01 | Stop reason: HOSPADM

## 2019-01-01 RX ORDER — FAMOTIDINE 20 MG/1
20 TABLET, FILM COATED ORAL DAILY
Qty: 30 TABLET | Refills: 2 | Status: SHIPPED | OUTPATIENT
Start: 2019-01-01 | End: 2020-03-13

## 2019-01-01 RX ORDER — BACITRACIN 500 [USP'U]/G
OINTMENT TOPICAL 2 TIMES DAILY
Status: DISCONTINUED | OUTPATIENT
Start: 2019-01-01 | End: 2019-01-01 | Stop reason: HOSPADM

## 2019-01-01 RX ORDER — LORAZEPAM 2 MG/ML
0.25 INJECTION INTRAMUSCULAR ONCE AS NEEDED
Status: CANCELLED | OUTPATIENT
Start: 2019-01-01 | End: 2030-08-07

## 2019-01-01 RX ORDER — INSULIN ASPART 100 [IU]/ML
1-10 INJECTION, SOLUTION INTRAVENOUS; SUBCUTANEOUS
Status: DISCONTINUED | OUTPATIENT
Start: 2019-01-01 | End: 2019-01-01 | Stop reason: HOSPADM

## 2019-01-01 RX ORDER — POLYETHYLENE GLYCOL 3350 17 G/17G
17 POWDER, FOR SOLUTION ORAL DAILY
Status: DISCONTINUED | OUTPATIENT
Start: 2019-01-01 | End: 2019-01-01 | Stop reason: HOSPADM

## 2019-01-01 RX ORDER — LEVETIRACETAM 500 MG/1
500 TABLET ORAL 2 TIMES DAILY
Status: DISCONTINUED | OUTPATIENT
Start: 2019-01-01 | End: 2019-01-01 | Stop reason: HOSPADM

## 2019-01-01 RX ORDER — LISINOPRIL AND HYDROCHLOROTHIAZIDE 10; 12.5 MG/1; MG/1
1 TABLET ORAL DAILY
Status: DISCONTINUED | OUTPATIENT
Start: 2019-01-01 | End: 2019-01-01

## 2019-01-01 RX ORDER — ACETAMINOPHEN 10 MG/ML
INJECTION, SOLUTION INTRAVENOUS
Status: DISCONTINUED | OUTPATIENT
Start: 2019-01-01 | End: 2019-01-01

## 2019-01-01 RX ORDER — AMOXICILLIN 250 MG
1 CAPSULE ORAL 2 TIMES DAILY
Status: DISCONTINUED | OUTPATIENT
Start: 2019-01-01 | End: 2019-01-01 | Stop reason: HOSPADM

## 2019-01-01 RX ORDER — ACETAMINOPHEN 325 MG/1
650 TABLET ORAL EVERY 6 HOURS PRN
Status: DISCONTINUED | OUTPATIENT
Start: 2019-01-01 | End: 2019-01-01

## 2019-01-01 RX ORDER — CIPROFLOXACIN 500 MG/1
500 TABLET ORAL 2 TIMES DAILY
Status: ON HOLD | COMMUNITY
End: 2019-01-01 | Stop reason: HOSPADM

## 2019-01-01 RX ORDER — GLUCAGON 1 MG
1 KIT INJECTION
Status: DISCONTINUED | OUTPATIENT
Start: 2019-01-01 | End: 2019-01-01 | Stop reason: HOSPADM

## 2019-01-01 RX ORDER — NEOSTIGMINE METHYLSULFATE 1 MG/ML
INJECTION, SOLUTION INTRAVENOUS
Status: DISCONTINUED | OUTPATIENT
Start: 2019-01-01 | End: 2019-01-01

## 2019-01-01 RX ORDER — HYDROMORPHONE HYDROCHLORIDE 1 MG/ML
0.2 INJECTION, SOLUTION INTRAMUSCULAR; INTRAVENOUS; SUBCUTANEOUS EVERY 5 MIN PRN
Status: CANCELLED | OUTPATIENT
Start: 2019-01-01

## 2019-01-01 RX ORDER — GLYCOPYRROLATE 0.2 MG/ML
INJECTION INTRAMUSCULAR; INTRAVENOUS
Status: DISCONTINUED | OUTPATIENT
Start: 2019-01-01 | End: 2019-01-01

## 2019-01-01 RX ORDER — NICARDIPINE HYDROCHLORIDE 0.2 MG/ML
INJECTION INTRAVENOUS
Status: DISPENSED
Start: 2019-01-01 | End: 2019-01-01

## 2019-01-01 RX ORDER — LEVOTHYROXINE SODIUM 75 UG/1
75 TABLET ORAL NIGHTLY
Status: DISCONTINUED | OUTPATIENT
Start: 2019-01-01 | End: 2019-01-01 | Stop reason: HOSPADM

## 2019-01-01 RX ORDER — ACETAMINOPHEN 325 MG/1
650 TABLET ORAL EVERY 6 HOURS PRN
Status: DISCONTINUED | OUTPATIENT
Start: 2019-01-01 | End: 2019-01-01 | Stop reason: HOSPADM

## 2019-01-01 RX ORDER — GADOBUTROL 604.72 MG/ML
8 INJECTION INTRAVENOUS
Status: COMPLETED | OUTPATIENT
Start: 2019-01-01 | End: 2019-01-01

## 2019-01-01 RX ORDER — PHENYLEPHRINE HYDROCHLORIDE 10 MG/ML
INJECTION INTRAVENOUS
Status: DISCONTINUED | OUTPATIENT
Start: 2019-01-01 | End: 2019-01-01

## 2019-01-01 RX ORDER — MANNITOL 20 G/100ML
80 INJECTION, SOLUTION INTRAVENOUS
Status: COMPLETED | OUTPATIENT
Start: 2019-01-01 | End: 2019-01-01

## 2019-01-01 RX ORDER — BUPIVACAINE HYDROCHLORIDE AND EPINEPHRINE 5; 5 MG/ML; UG/ML
INJECTION, SOLUTION EPIDURAL; INTRACAUDAL; PERINEURAL
Status: DISCONTINUED | OUTPATIENT
Start: 2019-01-01 | End: 2019-01-01 | Stop reason: HOSPADM

## 2019-01-01 RX ORDER — LISINOPRIL AND HYDROCHLOROTHIAZIDE 10; 12.5 MG/1; MG/1
1 TABLET ORAL NIGHTLY
Status: DISCONTINUED | OUTPATIENT
Start: 2019-01-01 | End: 2019-01-01

## 2019-01-01 RX ORDER — SODIUM CHLORIDE 9 MG/ML
INJECTION, SOLUTION INTRAVENOUS CONTINUOUS
Status: DISCONTINUED | OUTPATIENT
Start: 2019-01-01 | End: 2019-01-01

## 2019-01-01 RX ORDER — BACITRACIN 50000 [IU]/1
INJECTION, POWDER, FOR SOLUTION INTRAMUSCULAR
Status: DISCONTINUED | OUTPATIENT
Start: 2019-01-01 | End: 2019-01-01 | Stop reason: HOSPADM

## 2019-01-01 RX ORDER — SODIUM CHLORIDE 9 MG/ML
INJECTION, SOLUTION INTRAVENOUS CONTINUOUS PRN
Status: DISCONTINUED | OUTPATIENT
Start: 2019-01-01 | End: 2019-01-01

## 2019-01-01 RX ORDER — ONDANSETRON 2 MG/ML
INJECTION INTRAMUSCULAR; INTRAVENOUS
Status: DISCONTINUED | OUTPATIENT
Start: 2019-01-01 | End: 2019-01-01

## 2019-01-01 RX ORDER — ONDANSETRON 4 MG/1
4 TABLET, ORALLY DISINTEGRATING ORAL EVERY 6 HOURS PRN
Status: DISCONTINUED | OUTPATIENT
Start: 2019-01-01 | End: 2019-01-01 | Stop reason: HOSPADM

## 2019-01-01 RX ORDER — AMOXICILLIN 250 MG
1 CAPSULE ORAL DAILY
Status: DISCONTINUED | OUTPATIENT
Start: 2019-01-01 | End: 2019-01-01

## 2019-01-01 RX ORDER — METOPROLOL SUCCINATE 25 MG/1
25 TABLET, EXTENDED RELEASE ORAL DAILY
Status: ON HOLD | COMMUNITY
End: 2019-01-01 | Stop reason: SDUPTHER

## 2019-01-01 RX ORDER — FENTANYL CITRATE 50 UG/ML
INJECTION, SOLUTION INTRAMUSCULAR; INTRAVENOUS
Status: DISCONTINUED | OUTPATIENT
Start: 2019-01-01 | End: 2019-01-01

## 2019-01-01 RX ORDER — HEPARIN SODIUM 5000 [USP'U]/ML
5000 INJECTION, SOLUTION INTRAVENOUS; SUBCUTANEOUS EVERY 8 HOURS
Status: DISCONTINUED | OUTPATIENT
Start: 2019-01-01 | End: 2019-01-01 | Stop reason: HOSPADM

## 2019-01-01 RX ORDER — LEVETIRACETAM 500 MG/1
500 TABLET ORAL 2 TIMES DAILY
Qty: 60 TABLET | Refills: 2 | Status: ON HOLD | OUTPATIENT
Start: 2019-01-01 | End: 2019-01-01 | Stop reason: SDUPTHER

## 2019-01-01 RX ORDER — ROCURONIUM BROMIDE 10 MG/ML
INJECTION, SOLUTION INTRAVENOUS
Status: DISCONTINUED | OUTPATIENT
Start: 2019-01-01 | End: 2019-01-01

## 2019-01-01 RX ORDER — METOPROLOL SUCCINATE 25 MG/1
25 TABLET, EXTENDED RELEASE ORAL NIGHTLY
Status: DISCONTINUED | OUTPATIENT
Start: 2019-01-01 | End: 2019-01-01 | Stop reason: HOSPADM

## 2019-01-01 RX ORDER — LIDOCAINE HCL/PF 100 MG/5ML
SYRINGE (ML) INTRAVENOUS
Status: DISCONTINUED | OUTPATIENT
Start: 2019-01-01 | End: 2019-01-01

## 2019-01-01 RX ORDER — GADOBUTROL 604.72 MG/ML
9 INJECTION INTRAVENOUS
Status: COMPLETED | OUTPATIENT
Start: 2019-01-01 | End: 2019-01-01

## 2019-01-01 RX ORDER — SODIUM CHLORIDE 0.9 % (FLUSH) 0.9 %
3 SYRINGE (ML) INJECTION
Status: DISCONTINUED | OUTPATIENT
Start: 2019-01-01 | End: 2019-01-01 | Stop reason: HOSPADM

## 2019-01-01 RX ORDER — HYDRALAZINE HYDROCHLORIDE 20 MG/ML
10 INJECTION INTRAMUSCULAR; INTRAVENOUS EVERY 6 HOURS PRN
Status: DISCONTINUED | OUTPATIENT
Start: 2019-01-01 | End: 2019-01-01 | Stop reason: HOSPADM

## 2019-01-01 RX ORDER — HYDROCODONE BITARTRATE AND ACETAMINOPHEN 5; 325 MG/1; MG/1
1 TABLET ORAL
Qty: 50 TABLET | Refills: 0 | Status: ON HOLD | OUTPATIENT
Start: 2019-01-01 | End: 2019-01-01 | Stop reason: HOSPADM

## 2019-01-01 RX ORDER — SULFAMETHOXAZOLE AND TRIMETHOPRIM 800; 160 MG/1; MG/1
1 TABLET ORAL 2 TIMES DAILY
Qty: 20 TABLET | Refills: 0 | Status: ON HOLD | OUTPATIENT
Start: 2019-01-01 | End: 2019-01-01 | Stop reason: HOSPADM

## 2019-01-01 RX ORDER — DEXAMETHASONE SODIUM PHOSPHATE 4 MG/ML
INJECTION, SOLUTION INTRA-ARTICULAR; INTRALESIONAL; INTRAMUSCULAR; INTRAVENOUS; SOFT TISSUE
Status: DISCONTINUED | OUTPATIENT
Start: 2019-01-01 | End: 2019-01-01

## 2019-01-01 RX ORDER — IBUPROFEN 200 MG
24 TABLET ORAL
Status: DISCONTINUED | OUTPATIENT
Start: 2019-01-01 | End: 2019-01-01 | Stop reason: HOSPADM

## 2019-01-01 RX ORDER — BACITRACIN ZINC 500 UNIT/G
OINTMENT (GRAM) TOPICAL
Status: DISCONTINUED | OUTPATIENT
Start: 2019-01-01 | End: 2019-01-01 | Stop reason: HOSPADM

## 2019-01-01 RX ORDER — METOPROLOL SUCCINATE 25 MG/1
25 TABLET, EXTENDED RELEASE ORAL NIGHTLY
Start: 2019-01-01

## 2019-01-01 RX ORDER — METFORMIN HYDROCHLORIDE 1000 MG/1
1000 TABLET ORAL 2 TIMES DAILY WITH MEALS
COMMUNITY

## 2019-01-01 RX ORDER — DEXAMETHASONE 4 MG/1
4 TABLET ORAL EVERY 6 HOURS
Status: DISCONTINUED | OUTPATIENT
Start: 2019-01-01 | End: 2019-01-01

## 2019-01-01 RX ORDER — DEXAMETHASONE 2 MG/1
TABLET ORAL
Qty: 30 TABLET | Refills: 2 | Status: ON HOLD | OUTPATIENT
Start: 2019-01-01 | End: 2019-01-01 | Stop reason: SDUPTHER

## 2019-01-01 RX ADMIN — DEXAMETHASONE 4 MG: 4 TABLET ORAL at 05:03

## 2019-01-01 RX ADMIN — ACETAMINOPHEN 650 MG: 325 TABLET ORAL at 03:03

## 2019-01-01 RX ADMIN — PHENYLEPHRINE HYDROCHLORIDE 50 MCG: 10 INJECTION INTRAVENOUS at 04:03

## 2019-01-01 RX ADMIN — INSULIN ASPART 6 UNITS: 100 INJECTION, SOLUTION INTRAVENOUS; SUBCUTANEOUS at 11:03

## 2019-01-01 RX ADMIN — ONDANSETRON 4 MG: 2 INJECTION INTRAMUSCULAR; INTRAVENOUS at 03:03

## 2019-01-01 RX ADMIN — FAMOTIDINE 20 MG: 20 TABLET ORAL at 08:03

## 2019-01-01 RX ADMIN — LEVETIRACETAM 500 MG: 500 TABLET ORAL at 09:03

## 2019-01-01 RX ADMIN — LEVOTHYROXINE SODIUM 75 MCG: 75 TABLET ORAL at 08:03

## 2019-01-01 RX ADMIN — GLYCOPYRROLATE 0.6 MG: 0.2 INJECTION, SOLUTION INTRAMUSCULAR; INTRAVENOUS at 06:03

## 2019-01-01 RX ADMIN — LEVETIRACETAM 500 MG: 500 TABLET ORAL at 10:03

## 2019-01-01 RX ADMIN — SODIUM CHLORIDE: 0.9 INJECTION, SOLUTION INTRAVENOUS at 09:03

## 2019-01-01 RX ADMIN — FENTANYL CITRATE 25 MCG: 50 INJECTION, SOLUTION INTRAMUSCULAR; INTRAVENOUS at 04:03

## 2019-01-01 RX ADMIN — DEXAMETHASONE 4 MG: 4 TABLET ORAL at 01:03

## 2019-01-01 RX ADMIN — LISINOPRIL AND HYDROCHLOROTHIAZIDE 1 TABLET: 12.5; 1 TABLET ORAL at 08:03

## 2019-01-01 RX ADMIN — MUPIROCIN 1 G: 20 OINTMENT TOPICAL at 08:03

## 2019-01-01 RX ADMIN — ROCURONIUM BROMIDE 50 MG: 10 INJECTION, SOLUTION INTRAVENOUS at 03:03

## 2019-01-01 RX ADMIN — PROPOFOL 40 MG: 10 INJECTION, EMULSION INTRAVENOUS at 03:03

## 2019-01-01 RX ADMIN — METOPROLOL SUCCINATE 25 MG: 25 TABLET, EXTENDED RELEASE ORAL at 08:03

## 2019-01-01 RX ADMIN — DEXAMETHASONE 4 MG: 4 TABLET ORAL at 11:03

## 2019-01-01 RX ADMIN — FENTANYL CITRATE 150 MCG: 50 INJECTION, SOLUTION INTRAMUSCULAR; INTRAVENOUS at 03:03

## 2019-01-01 RX ADMIN — LEVETIRACETAM 500 MG: 500 TABLET ORAL at 08:03

## 2019-01-01 RX ADMIN — PROPOFOL 50 MG: 10 INJECTION, EMULSION INTRAVENOUS at 04:03

## 2019-01-01 RX ADMIN — IOHEXOL 100 ML: 350 INJECTION, SOLUTION INTRAVENOUS at 09:03

## 2019-01-01 RX ADMIN — CEFTRIAXONE 2 G: 1 INJECTION, SOLUTION INTRAVENOUS at 03:03

## 2019-01-01 RX ADMIN — POLYETHYLENE GLYCOL 3350 17 G: 17 POWDER, FOR SOLUTION ORAL at 09:03

## 2019-01-01 RX ADMIN — GADOBUTROL 9 ML: 604.72 INJECTION INTRAVENOUS at 02:03

## 2019-01-01 RX ADMIN — ACETAMINOPHEN 1000 MG: 10 INJECTION, SOLUTION INTRAVENOUS at 03:03

## 2019-01-01 RX ADMIN — Medication 20 MG: at 03:03

## 2019-01-01 RX ADMIN — INSULIN ASPART 3 UNITS: 100 INJECTION, SOLUTION INTRAVENOUS; SUBCUTANEOUS at 11:03

## 2019-01-01 RX ADMIN — SODIUM CHLORIDE: 0.9 INJECTION, SOLUTION INTRAVENOUS at 03:03

## 2019-01-01 RX ADMIN — INSULIN ASPART 2 UNITS: 100 INJECTION, SOLUTION INTRAVENOUS; SUBCUTANEOUS at 11:03

## 2019-01-01 RX ADMIN — LIDOCAINE HYDROCHLORIDE 80 MG: 20 INJECTION, SOLUTION INTRAVENOUS at 03:03

## 2019-01-01 RX ADMIN — PHENYLEPHRINE HYDROCHLORIDE 100 MCG: 10 INJECTION INTRAVENOUS at 03:03

## 2019-01-01 RX ADMIN — INSULIN ASPART 6 UNITS: 100 INJECTION, SOLUTION INTRAVENOUS; SUBCUTANEOUS at 04:03

## 2019-01-01 RX ADMIN — GLYCOPYRROLATE 0.2 MG: 0.2 INJECTION, SOLUTION INTRAMUSCULAR; INTRAVENOUS at 03:03

## 2019-01-01 RX ADMIN — STANDARDIZED SENNA CONCENTRATE AND DOCUSATE SODIUM 1 TABLET: 8.6; 5 TABLET, FILM COATED ORAL at 09:03

## 2019-01-01 RX ADMIN — LEVETIRACETAM 500 MG: 500 TABLET ORAL at 11:03

## 2019-01-01 RX ADMIN — SODIUM CHLORIDE, SODIUM GLUCONATE, SODIUM ACETATE, POTASSIUM CHLORIDE, MAGNESIUM CHLORIDE, SODIUM PHOSPHATE, DIBASIC, AND POTASSIUM PHOSPHATE: .53; .5; .37; .037; .03; .012; .00082 INJECTION, SOLUTION INTRAVENOUS at 03:03

## 2019-01-01 RX ADMIN — DEXAMETHASONE SODIUM PHOSPHATE 12 MG: 4 INJECTION, SOLUTION INTRAMUSCULAR; INTRAVENOUS at 03:03

## 2019-01-01 RX ADMIN — BACITRACIN: 500 OINTMENT TOPICAL at 08:03

## 2019-01-01 RX ADMIN — PROPOFOL 100 MG: 10 INJECTION, EMULSION INTRAVENOUS at 03:03

## 2019-01-01 RX ADMIN — BACITRACIN: 500 OINTMENT TOPICAL at 09:03

## 2019-01-01 RX ADMIN — MUPIROCIN 1 G: 20 OINTMENT TOPICAL at 09:03

## 2019-01-01 RX ADMIN — GADOBUTROL 8 ML: 604.72 INJECTION INTRAVENOUS at 06:03

## 2019-01-01 RX ADMIN — ROCURONIUM BROMIDE 10 MG: 10 INJECTION, SOLUTION INTRAVENOUS at 04:03

## 2019-01-01 RX ADMIN — MANNITOL 80 G: 20 INJECTION, SOLUTION INTRAVENOUS at 05:03

## 2019-01-01 RX ADMIN — FAMOTIDINE 20 MG: 20 TABLET ORAL at 09:03

## 2019-01-01 RX ADMIN — DEXAMETHASONE 4 MG: 4 TABLET ORAL at 12:03

## 2019-01-01 RX ADMIN — NEOSTIGMINE METHYLSULFATE 4 MG: 1 INJECTION INTRAVENOUS at 06:03

## 2019-01-01 RX ADMIN — Medication 10 MG: at 06:03

## 2019-01-01 RX ADMIN — ACETAMINOPHEN 650 MG: 325 TABLET ORAL at 10:03

## 2019-01-01 RX ADMIN — INSULIN ASPART 2 UNITS: 100 INJECTION, SOLUTION INTRAVENOUS; SUBCUTANEOUS at 07:03

## 2019-01-01 RX ADMIN — INSULIN ASPART 3 UNITS: 100 INJECTION, SOLUTION INTRAVENOUS; SUBCUTANEOUS at 04:03

## 2019-03-08 PROBLEM — G93.89 BRAIN MASS: Status: ACTIVE | Noted: 2019-01-01

## 2019-03-08 NOTE — ED NOTES
Pt family at bedside is at bedside aware of plan of care for pt to be admitted to ICU. Pt resting in bed on cardiac, bp and o2 monitor. RR 20 even and unlabored.

## 2019-03-08 NOTE — ED PROVIDER NOTES
"Encounter Date: 3/8/2019    SCRIBE #1 NOTE: I, Darlyn Heard, am scribing for, and in the presence of, Dr. Ragsdale.       History     Chief Complaint   Patient presents with    Abnormal MRI     sent from [place] [facility] with 'brain tumor on mri today       Time seen by provider: 4:34 PM on 03/08/2019    Ms. Jolene Velasquez is a 79 y.o. female with Hx of thyroid disease, DM, and HTN who presents to the ED for evaluation of brain tumor with complaint of worsening abnormal behavior for 4 days. Patient saw sent for an MRI, because "she was acting weird". Four days ago her son noted she was driving erratically and was concerned she had a stroke. Her daughter checked on her that night and noted the doors to the house were "wide open with the lights on and she was sleeping". MRI at [facility] shows brain tumor. She is oriented to the person, place, time and situation. Denies balance changes, numbness, tingling, fevers, chills, n/v, abd pain, chest pain, SOB or any other symptoms at this time.       The history is provided by the patient and a relative.     Review of patient's allergies indicates:  No Known Allergies  Past Medical History:   Diagnosis Date    Diabetes mellitus     Hypertension     Thyroid disease      History reviewed. No pertinent surgical history.  Family History   Problem Relation Age of Onset    Diabetes Mother      Social History     Tobacco Use    Smoking status: Not on file   Substance Use Topics    Alcohol use: Not on file    Drug use: Not on file     Review of Systems   Constitutional: Negative.    HENT: Negative.    Eyes: Negative for visual disturbance.   Respiratory: Negative.    Cardiovascular: Negative.    Gastrointestinal: Negative.    Musculoskeletal: Negative for gait problem.   Neurological: Negative for dizziness, facial asymmetry, speech difficulty, weakness, light-headedness, numbness and headaches.   Psychiatric/Behavioral: Positive for behavioral problems and " confusion.   All other systems reviewed and are negative.      Physical Exam     Initial Vitals [03/08/19 1503]   BP Pulse Resp Temp SpO2   (!) 162/67 99 18 97.9 °F (36.6 °C) 99 %      MAP       --         Physical Exam    Nursing note and vitals reviewed.  Constitutional: She appears well-developed and well-nourished. She is not diaphoretic. No distress.   HENT:   Head: Normocephalic and atraumatic.   Right Ear: External ear normal.   Left Ear: External ear normal.   Eyes: Conjunctivae and EOM are normal. Pupils are equal, round, and reactive to light. Right eye exhibits no nystagmus. Left eye exhibits no nystagmus.   Due to inattentiveness unable to fully assess visual fields. No nystagmus.   Neck: Neck supple.   Cardiovascular: Normal rate, regular rhythm, normal heart sounds and intact distal pulses.   Pulmonary/Chest: Breath sounds normal. No respiratory distress. She has no wheezes. She has no rhonchi. She has no rales.   Abdominal: Soft. She exhibits no distension. There is no tenderness.   Musculoskeletal: Normal range of motion.   Moving all extremities spontaneously.    Neurological: She is alert and oriented to person, place, and time. No cranial nerve deficit. GCS score is 15. GCS eye subscore is 4. GCS verbal subscore is 5. GCS motor subscore is 6.   Questionable mild left upper extremity weakness.   Skin: Skin is warm. Capillary refill takes less than 2 seconds. No rash noted.   Psychiatric:   Very inattentive.          ED Course   Procedures  Labs Reviewed   CBC W/ AUTO DIFFERENTIAL - Abnormal; Notable for the following components:       Result Value    RBC 3.65 (*)     Hemoglobin 11.5 (*)     Hematocrit 35.3 (*)     MCH 31.5 (*)     All other components within normal limits   BASIC METABOLIC PANEL - Abnormal; Notable for the following components:    Glucose 217 (*)     BUN, Bld 29 (*)     eGFR if  41.8 (*)     eGFR if non  36.3 (*)     All other components within  normal limits   URINALYSIS, REFLEX TO URINE CULTURE - Abnormal; Notable for the following components:    Glucose, UA 2+ (*)     Occult Blood UA 1+ (*)     All other components within normal limits    Narrative:     Preferred Collection Type->Urine, Clean Catch   PROTIME-INR   URINALYSIS MICROSCOPIC    Narrative:     Preferred Collection Type->Urine, Clean Catch   APTT   PROTIME-INR   APTT    Narrative:     add on APTT #[id number] per Stevo Ragsdale MD @ 21:44  03/08/2019                Medical Decision Making:   History:   Old Medical Records: I decided to obtain old medical records.  Clinical Tests:   Lab Tests: Ordered and Reviewed  Radiological Study: Reviewed  ED Management:  Vitals normal. Afebrile. Here w/ brain tumor. MRI reviewed; has large right fontal lobe mass with vasogenic edema.     CBC, BMP, UA coags ordered. Neurosurgery consulted. They recommended Neuro ICU consultation as they would likely need admission. No concern for airway compromise at this time.    Mannitol given due to cerebral edema.     Labs reviewed; grossly WNL w/o actionable values.   Discussed with nsgy and neuro ICU. NSGY to admit to floor due as she appears stable.  Other:   I have discussed this case with another health care provider.            Scribe Attestation:   Scribe #1: I performed the above scribed service and the documentation accurately describes the services I performed. I attest to the accuracy of the note.    Attending Attestation:         Attending Critical Care:   Critical Care Times:   ==============================================================  · Total Critical Care Time - exclusive of procedural time: 30 minutes.  ==============================================================  Critical care reasons: brain tumor with vasogenic edema.   Critical care was time spent personally by me on the following activities: obtaining history from patient or relative, examination of patient, review of old charts, ordering  lab, x-rays, and/or EKG, development of treatment plan with patient or relative, review of x-rays / CT sent with the patient, ordering and performing treatments and interventions, evaluation of patient's response to treatment, discussion with consultants, interpretation of cardiac measurements and re-evaluation of patient's conition.   Critical Care Condition: potentially life-threatening                  Clinical Impression:       ICD-10-CM ICD-9-CM   1. Brain mass G93.9 348.9   2. Altered mental status, unspecified altered mental status type R41.82 780.97   3. Hypertension, unspecified type I10 401.9   4. Hyperglycemia R73.9 790.29         Disposition:   Disposition: Admitted  Condition: Stable                        Stevo Ragsdale MD  03/09/19 3423

## 2019-03-08 NOTE — ED TRIAGE NOTES
Pt presents to the ED from [facility] (Dr. Dave) for an abnormal MRI that showed a brain mass. Pt reports she was diagnosed with a UTI and was not acting herself before seeing Dr. Dave.    Patient identifiers verified and correct for Jolene Eva.   LOC: The patient is awake, alert and aware of environment with an appropriate affect, the patient is oriented x 3 and speaking appropriately. Pt exhibits inattentiveness with following directions.  APPEARANCE: Patient appears comfortable and in no acute distress, patient is clean and well groomed.  SKIN: The skin is warm and dry, color consistent with ethnicity, patient has normal skin turgor and moist mucus membranes, skin intact, no breakdown or bruising noted.   MUSCULOSKELETAL: Patient moving all extremities spontaneously, no swelling noted.   RESPIRATORY: Airway is open and patent, respirations are spontaneous, patient has a normal effort and rate, no accessory muscle use noted, pt placed on continuous pulse ox with O2 sats noted at 99% on room air.  CARDIAC: Pt placed on cardiac monitor. Patient has a normal rate and regular rhythm, no edema noted, capillary refill < 3 seconds.  GASTRO: Soft and non tender to palpation, no distention noted, normoactive bowel sounds present in all four quadrants. Pt states bowel movements have been regular.  : Pt denies any pain or frequency with urination.  NEURO: Pt opens eyes spontaneously, behavior appropriate to situation, follows commands, facial expression symmetrical, bilateral hand grasp equal and even, purposeful motor response noted, normal sensation in all extremities when touched with a finger.

## 2019-03-09 PROBLEM — E27.8 ADRENAL CYST: Status: ACTIVE | Noted: 2019-01-01

## 2019-03-09 PROBLEM — Z01.818 PREOP EXAMINATION: Status: ACTIVE | Noted: 2019-01-01

## 2019-03-09 PROBLEM — R92.8 ABNORMAL FINDING ON BREAST IMAGING: Status: ACTIVE | Noted: 2019-01-01

## 2019-03-09 NOTE — ED NOTES
Family appears to be frustrated about not receiving CT results and room not being ready. Family informed that results are not yet in and family instructed again that MD will go over results once resulted. Family and pt also informed that room is assigned but dirty and will be updated as the status of bed changes.

## 2019-03-09 NOTE — ASSESSMENT & PLAN NOTE
- CT done 3/8/19: Scattered soft tissue densities throughout bilateral breasts likely representing breast tissue and/or scattered lymph nodes.  Large focus located in the right inferior breast measuring approximately 1.9 cm.    - recommend annual screening if not already completed.

## 2019-03-09 NOTE — NURSING
Patient arrived to NSU at 0130. AAOx4. Respirations even and unlabored. No s/s of distress noted. Able to verbalize needs to staff. Denies any pain at this time. Continent of bowel and bladder. Requires assistance to bathroom. Educated on the use of the call light. Able to return demonstration on the use of the call light. Bed locked in lowest position. Will continue to monitor.

## 2019-03-09 NOTE — HOSPITAL COURSE
3/8: Admit to NSGY service. CT c/a/p ordered for metastatic work up. Started on Keppra. Xarelto held.   3/9: NAEON. Wants to know update on surgical plan. Hospital medicine assisting with w/up, CT CAP today.   3/10: consent for surgery tomorrow. MRI stealth complete, remains stable on the floor.   3/11: OR for craniotomy for tumor resection. No intra op complications. Tolerated procedure well. Recovered in neuro ICU.   3/12: -161, AF.  Stable for step down to nsgy. Continuing Dex and keppra.   3/13: NAEON. Pain controlled. Tolerating diet. DC home with . FU with Dr. Villalobos in 2 weeks. Discharge instructions given verbally to patient and family. All of their questions were answered. They were encouraged to call the clinic with any questions or concerns prior to follow up appt.

## 2019-03-09 NOTE — ED NOTES
Family asking about CT results and informed that MD will discuss results once they are resulted. Pt updated on status of room and is v/u. Will conitune to monitor.

## 2019-03-09 NOTE — HPI
"Ms. Jolene Velasquez is a 80 yo female with PMHx hypothyroidism, DM, HTN who presents to the ED for evaluation of a brain mass. She was recently seen by her PCP for abnormal behavior. Patient's family at bedside. Reports patient has been "acting weird" over the past month. Reports started with dizziness, patient's family thought she had a UTI, was prescribed cipro. Family states over the past month she has exhibited odd behavior such as leaving the front door open and lights on when she was sleeping, skipping showers, difficulty dressing herself. MRI at [facility] shows brain tumor. Sent to [facility] for neurosurgical evaluation. Patient alert and oriented on exam. Participates fully. Denies headache, visual changes, seizure activity, recent weight loss, numbness, paresthesias, gait difficulty, b/b incontinence. On Xarelto, patient took last night.       "

## 2019-03-09 NOTE — PROGRESS NOTES
"[facility]  Neurosurgery  Progress Note    Subjective:     History of Present Illness: Patient is a 80 yo female with PMHx hypothyroidism, DM, HTN who presents to the ED for evaluation of a brain mass. She was recently seen by her PCP for abnormal behavior. Patient's family at bedside. Reports patient has been "acting weird" over the past month. Reports started with dizziness, patient's family thought she had a UTI, was prescribed cipro. Family states over the past month she has exhibited odd behavior such as leaving the front door open and lights on when she was sleeping, skipping showers, difficulty dressing herself. MRI at [facility] shows brain tumor. Sent to [facility] for neurosurgical evaluation. Patient alert and oriented on exam. Participates fully. Denies headache, visual changes, seizure activity, recent weight loss, numbness, paresthesias, gait difficulty, b/b incontinence. On Xarelto, patient took last night.         Post-Op Info:  Procedure(s) (LRB):  CRANIOTOMY for tumor resection; neuromonitoring (Right)         Interval History: TEDDY ON. Wants to know update on surgical plan. Hospital medicine assisting with w/up, CT CAP today.     Medications:  Continuous Infusions:  Scheduled Meds:   levETIRAcetam  500 mg Oral BID    levothyroxine  75 mcg Oral QHS    lisinopril-hydrochlorothiazide  1 tablet Oral QHS    metoprolol succinate  25 mg Oral QHS     PRN Meds:acetaminophen, dextrose 50%, dextrose 50%, glucagon (human recombinant), glucose, glucose, glucose, hydrALAZINE, insulin aspart U-100, ondansetron     Review of Systems  Objective:     Weight: 79.1 kg (174 lb 6.1 oz)  Body mass index is 31.9 kg/m².  Vital Signs (Most Recent):  Temp: 97.3 °F (36.3 °C) (03/09/19 1253)  Pulse: 91 (03/09/19 1253)  Resp: 18 (03/09/19 1253)  BP: (!) 124/56 (03/09/19 1253)  SpO2: 95 % (03/09/19 1253) Vital Signs (24h Range):  Temp:  [97.3 °F (36.3 °C)-99.3 °F (37.4 °C)] 97.3 °F (36.3 " °C)  Pulse:  [62-99] 91  Resp:  [10-19] 18  SpO2:  [95 %-100 %] 95 %  BP: (105-177)/(50-76) 124/56                           Neurosurgery Physical Exam  Intact neurologically  PERRL, EOMI, no f/d, tongue midline  No pronator drift  Strength 5/5 in extremities  SILT    Significant Labs:  Recent Labs   Lab 03/08/19  1657 03/09/19  0322   * 157*    136   K 3.9 3.9    100   CO2 25 24   BUN 29* 24*   CREATININE 1.4 1.3   CALCIUM 10.5 10.0     Recent Labs   Lab 03/08/19  1657 03/09/19  0322   WBC 10.06 8.38   HGB 11.5* 11.0*   HCT 35.3* 34.8*    275     Recent Labs   Lab 03/08/19 1657   INR 1.1   APTT 24.0     Microbiology Results (last 7 days)     ** No results found for the last 168 hours. **        Recent Lab Results       03/09/19  0813   03/09/19 0322   03/08/19  1811   03/08/19 1657        Immature Granulocytes   0.2   0.4     Immature Grans (Abs)   0.02  Comment:  Mild elevation in immature granulocytes is non specific and   can be seen in a variety of conditions including stress response,   acute inflammation, trauma and pregnancy. Correlation with other   laboratory and clinical findings is essential.     0.04  Comment:  Mild elevation in immature granulocytes is non specific and   can be seen in a variety of conditions including stress response,   acute inflammation, trauma and pregnancy. Correlation with other   laboratory and clinical findings is essential.       Anion Gap   12   11     Appearance, UA     Clear       aPTT       24.0  Comment:  aPTT therapeutic range = 39-69 seconds     Baso #   0.06   0.04     Basophil%   0.7   0.4     Bilirubin (UA)     Negative       BUN, Bld   24   29     Calcium   10.0   10.5     Chloride   100   102     CO2   24   25     Color, UA     Straw       Creatinine   1.3   1.4     Differential Method   Automated   Automated     eGFR if    45.7   41.8     eGFR if non    39.7  Comment:  Calculation used to obtain the  estimated glomerular filtration  rate (eGFR) is the CKD-EPI equation.      36.3  Comment:  Calculation used to obtain the estimated glomerular filtration  rate (eGFR) is the CKD-EPI equation.        Eos #   0.1   0.1     Eosinophil%   0.8   0.7     Glucose   157   217     Glucose, UA     2+       Gran # (ANC)   4.4   6.2     Gran%   52.6   61.7     Hematocrit   34.8   35.3     Hemoglobin   11.0   11.5     Coumadin Monitoring INR       1.1  Comment:  Coumadin Therapy:  2.0 - 3.0 for INR for all indicators except mechanical heart valves  and antiphospholipid syndromes which should use 2.5 - 3.5.       Ketones, UA     Negative       Leukocytes, UA     Negative       Lymph #   3.1   2.9     Lymph%   37.1   28.5     MCH   31.2   31.5     MCHC   31.6   32.6     MCV   99   97     Microscopic Comment     SEE COMMENT  Comment:  Other formed elements not mentioned in the report are not   present in the microscopic examination.          Mono #   0.7   0.8     Mono%   8.6   8.3     MPV   10.6   10.3     Nitrite, UA     Negative       nRBC   0   0     Occult Blood UA     1+       pH, UA     5.0       Platelets   275   280     POCT Glucose 166           Potassium   3.9   3.9     Protein, UA     Negative  Comment:  Recommend a 24 hour urine protein or a urine   protein/creatinine ratio if globulin induced proteinuria is  clinically suspected.         Protime       11.7     RBC   3.53   3.65     RBC, UA     3       RDW   12.8   12.5     Sodium   136   138     Specific Gravity, UA     1.020       Specimen UA     Urine, Clean Catch       Squam Epithel, UA     1       WBC, UA     2       WBC   8.38   10.06           Significant Diagnostics:  CT: No results found in the last 24 hours.  MRI: No results found in the last 24 hours.    Assessment/Plan:     Brain mass    Patient is a 78 yo female with PMHx hypothyroidism, DM, HTN who presents to the ED for evaluation of a brain mass.    - Neurologically stable.   - Admit to NSGY. Stable for  floor status.   - MRI brain w wo contrast reveals right frontal enhancing mass with invasion into corpus callosum, extensive surrounding vasogenic edema and leftward midline shift.   - CT C A P with adrenal mass abnormal for age and breast mass.   - Booked for OR Monday for Craniotomy for tumor resection. To obtain consents. NPO Sunday night.   - Keppra 500 mg BID  - Will hold starting patient on dexamethasone at this time for possible lymphoma. No complaints of headaches on exam.   - CT chest/abdomen/pelvis ordered for metastatic workup  - MRI Stealth with Synaptive protocol ordered   - Consult placed to hospital medicine for pre-operative clearance  - Resume home meds for hypothyroidism and HTN.  - Hold Xarelto. Coags ordered, stable  - Diabetic diet  - DVT ppx: Compression stockings, SCDs  - Please call with any changes in exam.   - Discussed with Dr. Tammy Camarena MD  Neurosurgery  [facility]

## 2019-03-09 NOTE — SUBJECTIVE & OBJECTIVE
Interval History: TEDDY ON. Wants to know update on surgical plan. [facility] medicine assisting with w/up, CT CAP today.     Medications:  Continuous Infusions:  Scheduled Meds:   levETIRAcetam  500 mg Oral BID    levothyroxine  75 mcg Oral QHS    lisinopril-hydrochlorothiazide  1 tablet Oral QHS    metoprolol succinate  25 mg Oral QHS     PRN Meds:acetaminophen, dextrose 50%, dextrose 50%, glucagon (human recombinant), glucose, glucose, glucose, hydrALAZINE, insulin aspart U-100, ondansetron     Review of Systems  Objective:     Weight: 79.1 kg (174 lb 6.1 oz)  Body mass index is 31.9 kg/m².  Vital Signs (Most Recent):  Temp: 97.3 °F (36.3 °C) (03/09/19 1253)  Pulse: 91 (03/09/19 1253)  Resp: 18 (03/09/19 1253)  BP: (!) 124/56 (03/09/19 1253)  SpO2: 95 % (03/09/19 1253) Vital Signs (24h Range):  Temp:  [97.3 °F (36.3 °C)-99.3 °F (37.4 °C)] 97.3 °F (36.3 °C)  Pulse:  [62-99] 91  Resp:  [10-19] 18  SpO2:  [95 %-100 %] 95 %  BP: (105-177)/(50-76) 124/56                           Neurosurgery Physical Exam  Intact neurologically  PERRL, EOMI, no f/d, tongue midline  No pronator drift  Strength 5/5 in extremities  SILT    Significant Labs:  Recent Labs   Lab 03/08/19 1657 03/09/19  0322   * 157*    136   K 3.9 3.9    100   CO2 25 24   BUN 29* 24*   CREATININE 1.4 1.3   CALCIUM 10.5 10.0     Recent Labs   Lab 03/08/19 1657 03/09/19 0322   WBC 10.06 8.38   HGB 11.5* 11.0*   HCT 35.3* 34.8*    275     Recent Labs   Lab 03/08/19 1657   INR 1.1   APTT 24.0     Microbiology Results (last 7 days)     ** No results found for the last 168 hours. **        Recent Lab Results       03/09/19  0813   03/09/19  0322   03/08/19  1811   03/08/19  1657        Immature Granulocytes   0.2   0.4     Immature Grans (Abs)   0.02  Comment:  Mild elevation in immature granulocytes is non specific and   can be seen in a variety of conditions including stress response,   acute inflammation, trauma and pregnancy.  Correlation with other   laboratory and clinical findings is essential.     0.04  Comment:  Mild elevation in immature granulocytes is non specific and   can be seen in a variety of conditions including stress response,   acute inflammation, trauma and pregnancy. Correlation with other   laboratory and clinical findings is essential.       Anion Gap   12   11     Appearance, UA     Clear       aPTT       24.0  Comment:  aPTT therapeutic range = 39-69 seconds     Baso #   0.06   0.04     Basophil%   0.7   0.4     Bilirubin (UA)     Negative       BUN, Bld   24   29     Calcium   10.0   10.5     Chloride   100   102     CO2   24   25     Color, UA     Straw       Creatinine   1.3   1.4     Differential Method   Automated   Automated     eGFR if    45.7   41.8     eGFR if non    39.7  Comment:  Calculation used to obtain the estimated glomerular filtration  rate (eGFR) is the CKD-EPI equation.      36.3  Comment:  Calculation used to obtain the estimated glomerular filtration  rate (eGFR) is the CKD-EPI equation.        Eos #   0.1   0.1     Eosinophil%   0.8   0.7     Glucose   157   217     Glucose, UA     2+       Gran # (ANC)   4.4   6.2     Gran%   52.6   61.7     Hematocrit   34.8   35.3     Hemoglobin   11.0   11.5     Coumadin Monitoring INR       1.1  Comment:  Coumadin Therapy:  2.0 - 3.0 for INR for all indicators except mechanical heart valves  and antiphospholipid syndromes which should use 2.5 - 3.5.       Ketones, UA     Negative       Leukocytes, UA     Negative       Lymph #   3.1   2.9     Lymph%   37.1   28.5     MCH   31.2   31.5     MCHC   31.6   32.6     MCV   99   97     Microscopic Comment     SEE COMMENT  Comment:  Other formed elements not mentioned in the report are not   present in the microscopic examination.          Mono #   0.7   0.8     Mono%   8.6   8.3     MPV   10.6   10.3     Nitrite, UA     Negative       nRBC   0   0     Occult Blood UA     1+        pH, UA     5.0       Platelets   275   280     POCT Glucose 166           Potassium   3.9   3.9     Protein, UA     Negative  Comment:  Recommend a 24 hour urine protein or a urine   protein/creatinine ratio if globulin induced proteinuria is  clinically suspected.         Protime       11.7     RBC   3.53   3.65     RBC, UA     3       RDW   12.8   12.5     Sodium   136   138     Specific Gravity, UA     1.020       Specimen UA     Urine, Clean Catch       Squam Epithel, UA     1       WBC, UA     2       WBC   8.38   10.06           Significant Diagnostics:  CT: No results found in the last 24 hours.  MRI: No results found in the last 24 hours.

## 2019-03-09 NOTE — ASSESSMENT & PLAN NOTE
Patient is a 78 yo female with PMHx hypothyroidism, DM, HTN who presents to the ED for evaluation of a brain mass.    - Neurologically stable.   - No acute neurosurgical intervention necessary at this time  - Admit to NSGY. Stable for floor status.   - MRI brain w wo contrast reveals right frontal enhancing mass with invasion into corpus callosum, extensive surrounding vasogenic edema and leftward midline shift.   - Likely OR Monday for craniotomy for tumor resection  - Keppra 500 mg BID  - Will hold starting patient on dexamethasone at this time for possible lymphoma. No complaints of headaches on exam.   - CT chest/abdomen/pelvis ordered for metastatic workup  - MRI Stealth with Synaptive protocol ordered for tomorrow  - Consult placed to hospital medicine for pre-operative clearance  - Resume home meds for hypothyroidism and HTN.  - Hold Xarelto. Coags ordered.  - Diabetic diet  - DVT ppx: Compression stockings, SCDs  - Please call with any changes in exam.   - Discussed with Dr. Munoz

## 2019-03-09 NOTE — H&P
"[facility]  Neurosurgery  Progress Note    Subjective:     History of Present Illness: Patient is a 78 yo female with PMHx hypothyroidism, DM, HTN who presents to the ED for evaluation of a brain mass. She was recently seen by her PCP for abnormal behavior. Patient's family at bedside. Reports patient has been "acting weird" over the past month. Reports started with dizziness, patient's family thought she had a UTI, was prescribed cipro. Family states over the past month she has exhibited odd behavior such as leaving the front door open and lights on when she was sleeping, skipping showers, difficulty dressing herself. MRI at [facility] shows brain tumor. Sent to [facility] for neurosurgical evaluation. Patient alert and oriented on exam. Participates fully. Denies headache, visual changes, seizure activity, recent weight loss, numbness, paresthesias, gait difficulty, b/b incontinence. On Xarelto, patient took last night.         Post-Op Info:  * No surgery found *           (Not in a hospital admission)    Review of patient's allergies indicates:  No Known Allergies    Past Medical History:   Diagnosis Date    Diabetes mellitus     Hypertension     Thyroid disease      No past surgical history on file.  Family History     Problem Relation (Age of Onset)    Diabetes Mother        Tobacco Use    Smoking status: Not on file   Substance and Sexual Activity    Alcohol use: Not on file    Drug use: Not on file    Sexual activity: Not on file     Review of Systems   Constitutional: no fever, chills or night sweats. No changes in weight   Eyes: no visual changes   ENT: no nasal congestion or sore throat   Respiratory: no cough or shortness of breath   Cardiovascular: no chest pain or palpitations   Gastrointestinal: no nausea or vomiting   Genitourinary: no hematuria or dysuria   Integument/Breast: no rash or pruritis   Hematologic/Lymphatic: no easy bruising or lymphadenopathy "   Musculoskeletal: no arthralgias or myalgias.   Neurological: no seizures or tremors   Behavioral/Psych: no auditory or visual hallucinations   Endocrine: no heat or cold intolerance       Objective:     Weight: 81.2 kg (179 lb)  Body mass index is 32.74 kg/m².  Vital Signs (Most Recent):  Temp: 97.9 °F (36.6 °C) (03/08/19 1503)  Pulse: 86 (03/08/19 1831)  Resp: 17 (03/08/19 1831)  BP: (!) 154/65 (03/08/19 1831)  SpO2: 99 % (03/08/19 1831) Vital Signs (24h Range):  Temp:  [97.9 °F (36.6 °C)] 97.9 °F (36.6 °C)  Pulse:  [83-99] 86  Resp:  [10-18] 17  SpO2:  [98 %-100 %] 99 %  BP: (139-177)/(63-74) 154/65                      Neurosurgery Physical Exam    General: well developed, well nourished, no distress.   Head: normocephalic, atraumatic  Neurologic: Alert and oriented. Thought content appropriate.  GCS: Motor: 6/Verbal: 5/Eyes: 4 GCS Total: 15  Mental Status: Awake, Alert, Oriented x 4  Language: No aphasia  Speech: No dysarthria  Cranial nerves: face symmetric, tongue midline, CN II-XII grossly intact.   Eyes: pupils equal, round, reactive to light with accomodation, EOMI.   Pulmonary: normal respirations, no signs of respiratory distress  Abdomen: soft, non-distended, not tender to palpation  Sensory: intact to light touch throughout  Motor Strength: Moves all extremities spontaneously with good tone. Full strength upper and lower extremities. No abnormal movements seen.     Strength  Deltoids Triceps Biceps Wrist Extension Wrist Flexion Hand    Upper: R 5/5 5/5 5/5 5/5 5/5 5/5    L 5/5 5/5 5/5 5/5 5/5 5/5     Iliopsoas Quadriceps Knee  Flexion Tibialis  anterior Gastro- cnemius EHL   Lower: R 5/5 5/5 5/5 5/5 5/5 5/5    L 5/5 5/5 5/5 5/5 5/5 5/5     Pronator Drift: no drift noted  Finger-to-nose: Intact bilaterally  Mayorag: absent  Clonus: absent  Babinski: absent  Pulses: 2+ and symmetric radial and dorsalis pedis.  Skin: Skin is warm, dry and intact.      Significant Labs:  Recent Labs   Lab  03/08/19  1657   *      K 3.9      CO2 25   BUN 29*   CREATININE 1.4   CALCIUM 10.5     Recent Labs   Lab 03/08/19  1657   WBC 10.06   HGB 11.5*   HCT 35.3*        Recent Labs   Lab 03/08/19  1657   INR 1.1     Microbiology Results (last 7 days)     ** No results found for the last 168 hours. **        Recent Lab Results       03/08/19  1657        Immature Granulocytes 0.4     Immature Grans (Abs) 0.04  Comment:  Mild elevation in immature granulocytes is non specific and   can be seen in a variety of conditions including stress response,   acute inflammation, trauma and pregnancy. Correlation with other   laboratory and clinical findings is essential.       Anion Gap 11     Baso # 0.04     Basophil% 0.4     BUN, Bld 29     Calcium 10.5     Chloride 102     CO2 25     Creatinine 1.4     Differential Method Automated     eGFR if African American 41.8     eGFR if non  36.3  Comment:  Calculation used to obtain the estimated glomerular filtration  rate (eGFR) is the CKD-EPI equation.        Eos # 0.1     Eosinophil% 0.7     Glucose 217     Gran # (ANC) 6.2     Gran% 61.7     Hematocrit 35.3     Hemoglobin 11.5     Coumadin Monitoring INR 1.1  Comment:  Coumadin Therapy:  2.0 - 3.0 for INR for all indicators except mechanical heart valves  and antiphospholipid syndromes which should use 2.5 - 3.5.       Lymph # 2.9     Lymph% 28.5     MCH 31.5     MCHC 32.6     MCV 97     Mono # 0.8     Mono% 8.3     MPV 10.3     nRBC 0     Platelets 280     Potassium 3.9     Protime 11.7     RBC 3.65     RDW 12.5     Sodium 138     WBC 10.06         All pertinent labs from the last 24 hours have been reviewed.    Significant Diagnostics:  MRI: Mri Brain W Wo Contrast    Result Date: 3/8/2019  4.3 x 3.4 cm enhancing necrotic mass at the paramidline region of the right frontal lobe with invasion of the corpus callosum, extensive right frontal lobe vasogenic edema and an approximately 6 mm  leftward midline shift.  A high-grade primary brain neoplasm, specifically glioblastoma, leads the diagnostic considerations. Dr. Dave was notified of the findings shortly following completion of the exam. Electronically signed by: Tucker Jett MD Date:    03/08/2019 Time:    11:29    Assessment/Plan:     Brain mass    Patient is a 78 yo female with PMHx hypothyroidism, DM, HTN who presents to the ED for evaluation of a brain mass.    - Neurologically stable.   - No acute neurosurgical intervention necessary at this time  - Admit to NSGY. Stable for floor status.   - MRI brain w wo contrast reveals right frontal enhancing mass with invasion into corpus callosum, extensive surrounding vasogenic edema and leftward midline shift.   - Likely OR Monday for craniotomy for tumor resection  - Keppra 500 mg BID  - Will hold starting patient on dexamethasone at this time for possible lymphoma. No complaints of headaches on exam.   - CT chest/abdomen/pelvis ordered for metastatic workup  - MRI Stealth with Synaptive protocol ordered for tomorrow  - Consult placed to hospital medicine for pre-operative clearance  - Resume home meds for hypothyroidism and HTN.  - Hold Xarelto. Coags ordered.  - Diabetic diet  - SBP<160  - DVT ppx: Compression stockings, SCDs  - Please call with any changes in exam.   - Discussed with GIANCARLO CoronaC  Neurosurgery  [facility]

## 2019-03-09 NOTE — ED NOTES
Hourly rounding complete. Patient sitting onside of stretcher drinking oral contrast in preparation for CT scan and is in NAD at this time. Pt is awake alert and oriented x4, VSS, respirations even and unlabored.Pt updated on POC. Bed low and locked with side rails up x2, call bell in pt reach. Pt voices no needs at this time. Family bedside.

## 2019-03-09 NOTE — SUBJECTIVE & OBJECTIVE
Past Medical History:   Diagnosis Date    Diabetes mellitus     Hypertension     Thyroid disease        History reviewed. No pertinent surgical history.    Review of patient's allergies indicates:  No Known Allergies    No current facility-administered medications on file prior to encounter.      Current Outpatient Medications on File Prior to Encounter   Medication Sig    ciprofloxacin HCl (CIPRO) 500 MG tablet Take 500 mg by mouth 2 (two) times daily.    levothyroxine (SYNTHROID) 75 MCG tablet Take 75 mcg by mouth once daily.    lisinopril-hydrochlorothiazide (PRINZIDE,ZESTORETIC) 10-12.5 mg per tablet Take 1 tablet by mouth once daily.    metoprolol succinate (TOPROL-XL) 25 MG 24 hr tablet Take 25 mg by mouth once daily.    rivaroxaban (XARELTO) 20 mg Tab Take 20 mg by mouth daily with dinner or evening meal.     Family History     Problem Relation (Age of Onset)    Diabetes Mother        Tobacco Use    Smoking status: Not on file   Substance and Sexual Activity    Alcohol use: Not on file    Drug use: Not on file    Sexual activity: Not on file     Review of Systems   Constitutional: Negative for chills and fever.   HENT: Negative for ear discharge, ear pain, rhinorrhea and sore throat.    Eyes: Negative for pain.   Respiratory: Negative for cough and shortness of breath.    Cardiovascular: Negative for chest pain.   Gastrointestinal: Negative for abdominal pain, constipation, diarrhea, nausea and vomiting.   Genitourinary: Negative for dysuria and hematuria.   Musculoskeletal: Negative for back pain and neck pain.   Skin: Negative for rash.   Neurological: Negative for dizziness and headaches.     Objective:     Vital Signs (Most Recent):  Temp: 99.3 °F (37.4 °C) (03/09/19 0825)  Pulse: 75 (03/09/19 0825)  Resp: 18 (03/09/19 0825)  BP: (!) 107/58 (03/09/19 0825)  SpO2: 97 % (03/09/19 0825) Vital Signs (24h Range):  Temp:  [97.3 °F (36.3 °C)-99.3 °F (37.4 °C)] 99.3 °F (37.4 °C)  Pulse:  [62-99]  75  Resp:  [10-19] 18  SpO2:  [97 %-100 %] 97 %  BP: (105-177)/(50-76) 107/58     Weight: 79.1 kg (174 lb 6.1 oz)  Body mass index is 31.9 kg/m².    Physical Exam  General: Well developed, well nourished female in NAD  HEENT: Conjunctiva clear; Oropharynx clear  Neck: No JVD noted, Supple  CV: irregular rate. S1, S2 with no murmurs,gallops  Resp: Lungs CTA Bilaterally, Unlabored  Abdomen: NTND, BS normoactive x4 quads, soft  Extrem: No cyanosis, clubbing, edema.  Skin: No rashes, lesions, ulcers  Neuro: motor strength in tact. No focal deficit   PSYCH: Oriented x3    Significant Labs:   CBC:   Recent Labs   Lab 03/08/19  1657 03/09/19  0322   WBC 10.06 8.38   HGB 11.5* 11.0*   HCT 35.3* 34.8*    275     CMP:   Recent Labs   Lab 03/08/19  1657 03/09/19  0322    136   K 3.9 3.9    100   CO2 25 24   * 157*   BUN 29* 24*   CREATININE 1.4 1.3   CALCIUM 10.5 10.0   ANIONGAP 11 12   EGFRNONAA 36.3* 39.7*       Significant Imaging: I have reviewed all pertinent imaging results/findings within the past 24 hours.

## 2019-03-09 NOTE — ASSESSMENT & PLAN NOTE
- CT abdomen 3/8/19: Left adnexal cystic mass measuring approximately 6.5 cm, an abnormal finding in a postmenopausal patient.    - Recommend dedicated ultrasound evaluation per American College of [employer] guidelines.

## 2019-03-09 NOTE — SUBJECTIVE & OBJECTIVE
(Not in a hospital admission)    Review of patient's allergies indicates:  No Known Allergies    Past Medical History:   Diagnosis Date    Diabetes mellitus     Hypertension     Thyroid disease      No past surgical history on file.  Family History     Problem Relation (Age of Onset)    Diabetes Mother        Tobacco Use    Smoking status: Not on file   Substance and Sexual Activity    Alcohol use: Not on file    Drug use: Not on file    Sexual activity: Not on file     Review of Systems   Constitutional: no fever, chills or night sweats. No changes in weight   Eyes: no visual changes   ENT: no nasal congestion or sore throat   Respiratory: no cough or shortness of breath   Cardiovascular: no chest pain or palpitations   Gastrointestinal: no nausea or vomiting   Genitourinary: no hematuria or dysuria   Integument/Breast: no rash or pruritis   Hematologic/Lymphatic: no easy bruising or lymphadenopathy   Musculoskeletal: no arthralgias or myalgias.   Neurological: no seizures or tremors   Behavioral/Psych: no auditory or visual hallucinations   Endocrine: no heat or cold intolerance       Objective:     Weight: 81.2 kg (179 lb)  Body mass index is 32.74 kg/m².  Vital Signs (Most Recent):  Temp: 97.9 °F (36.6 °C) (03/08/19 1503)  Pulse: 86 (03/08/19 1831)  Resp: 17 (03/08/19 1831)  BP: (!) 154/65 (03/08/19 1831)  SpO2: 99 % (03/08/19 1831) Vital Signs (24h Range):  Temp:  [97.9 °F (36.6 °C)] 97.9 °F (36.6 °C)  Pulse:  [83-99] 86  Resp:  [10-18] 17  SpO2:  [98 %-100 %] 99 %  BP: (139-177)/(63-74) 154/65                      Neurosurgery Physical Exam    General: well developed, well nourished, no distress.   Head: normocephalic, atraumatic  Neurologic: Alert and oriented. Thought content appropriate.  GCS: Motor: 6/Verbal: 5/Eyes: 4 GCS Total: 15  Mental Status: Awake, Alert, Oriented x 4  Language: No aphasia  Speech: No dysarthria  Cranial nerves: face symmetric, tongue midline, CN II-XII grossly intact.    Eyes: pupils equal, round, reactive to light with accomodation, EOMI.   Pulmonary: normal respirations, no signs of respiratory distress  Abdomen: soft, non-distended, not tender to palpation  Sensory: intact to light touch throughout  Motor Strength: Moves all extremities spontaneously with good tone. Full strength upper and lower extremities. No abnormal movements seen.     Strength  Deltoids Triceps Biceps Wrist Extension Wrist Flexion Hand    Upper: R 5/5 5/5 5/5 5/5 5/5 5/5    L 5/5 5/5 5/5 5/5 5/5 5/5     Iliopsoas Quadriceps Knee  Flexion Tibialis  anterior Gastro- cnemius EHL   Lower: R 5/5 5/5 5/5 5/5 5/5 5/5    L 5/5 5/5 5/5 5/5 5/5 5/5     Pronator Drift: no drift noted  Finger-to-nose: Intact bilaterally  Mayorga: absent  Clonus: absent  Babinski: absent  Pulses: 2+ and symmetric radial and dorsalis pedis.  Skin: Skin is warm, dry and intact.      Significant Labs:  Recent Labs   Lab 03/08/19  1657   *      K 3.9      CO2 25   BUN 29*   CREATININE 1.4   CALCIUM 10.5     Recent Labs   Lab 03/08/19  1657   WBC 10.06   HGB 11.5*   HCT 35.3*        Recent Labs   Lab 03/08/19  1657   INR 1.1     Microbiology Results (last 7 days)     ** No results found for the last 168 hours. **        Recent Lab Results       03/08/19  1657        Immature Granulocytes 0.4     Immature Grans (Abs) 0.04  Comment:  Mild elevation in immature granulocytes is non specific and   can be seen in a variety of conditions including stress response,   acute inflammation, trauma and pregnancy. Correlation with other   laboratory and clinical findings is essential.       Anion Gap 11     Baso # 0.04     Basophil% 0.4     BUN, Bld 29     Calcium 10.5     Chloride 102     CO2 25     Creatinine 1.4     Differential Method Automated     eGFR if African American 41.8     eGFR if non  36.3  Comment:  Calculation used to obtain the estimated glomerular filtration  rate (eGFR) is the CKD-EPI  equation.        Eos # 0.1     Eosinophil% 0.7     Glucose 217     Gran # (ANC) 6.2     Gran% 61.7     Hematocrit 35.3     Hemoglobin 11.5     Coumadin Monitoring INR 1.1  Comment:  Coumadin Therapy:  2.0 - 3.0 for INR for all indicators except mechanical heart valves  and antiphospholipid syndromes which should use 2.5 - 3.5.       Lymph # 2.9     Lymph% 28.5     MCH 31.5     MCHC 32.6     MCV 97     Mono # 0.8     Mono% 8.3     MPV 10.3     nRBC 0     Platelets 280     Potassium 3.9     Protime 11.7     RBC 3.65     RDW 12.5     Sodium 138     WBC 10.06         All pertinent labs from the last 24 hours have been reviewed.    Significant Diagnostics:  MRI: Mri Brain W Wo Contrast    Result Date: 3/8/2019  4.3 x 3.4 cm enhancing necrotic mass at the paramidline region of the right frontal lobe with invasion of the corpus callosum, extensive right frontal lobe vasogenic edema and an approximately 6 mm leftward midline shift.  A high-grade primary brain neoplasm, specifically glioblastoma, leads the diagnostic considerations. Dr. Dave was notified of the findings shortly following completion of the exam. Electronically signed by: Tucker Jett MD Date:    03/08/2019 Time:    11:29

## 2019-03-09 NOTE — CONSULTS
"[facility] Hospital Medicine  Consult Note    Patient Name: Jolene Velasquez  MRN: [id number]  Admission Date: 3/8/2019  Hospital Length of Stay: 1 days  Attending Physician: Kenneth Munoz MD   Primary Care Provider: Cortez Dave MD     [facility] Medicine Team: Networked reference to record PCT  Nacho Carlos MD      Patient information was obtained from patient and past medical records.     Inpatient consult to Hospital Medicine-General  Consult performed by: Nacho Carlos MD  Consult ordered by: Micaela Ramey PA-C  Reason for consult: Pre-Op evaluation         Subjective:     Principal Problem: <principal problem not specified>    Chief Complaint:   Chief Complaint   Patient presents with    Abnormal MRI     sent from [place] with 'brain tumor on mri today        HPI: Patient is a 80 yo female with PMHx hypothyroidism, DM, HTN who presents to the ED for evaluation of a brain mass. She was recently seen by her PCP for abnormal behavior. Patient's family at bedside. Reports patient has been "acting weird" over the past month. Reports started with dizziness, patient's family thought she had a UTI, was prescribed cipro. MRI at [facility] shows brain tumor. Sent to [facility] for neurosurgical evaluation. Pt Denies headache, visual changes, seizure activity, recent weight loss, numbness, paresthesias, gait difficulty, b/b incontinence. On Xarelto for new diagnosis of afib.     Hospital medicine consulted for pre-op evaluation. Pt is diabetic but does not take medications for it.she takes Lisinopril-HCTZ for HTN. Per pt, she is able to walk for couple blocks with no chest pain or sob. Also,. Able to go up couple stairs with no chest pain or sob. No kidney issue, Cr <2. According to the family, she was diagnosed with afib based on EKG about four days ago and was started on Toprol xl and Xarelto .       Past Medical History:   Diagnosis Date    Diabetes mellitus     " Hypertension     Thyroid disease        History reviewed. No pertinent surgical history.    Review of patient's allergies indicates:  No Known Allergies    No current facility-administered medications on file prior to encounter.      Current Outpatient Medications on File Prior to Encounter   Medication Sig    ciprofloxacin HCl (CIPRO) 500 MG tablet Take 500 mg by mouth 2 (two) times daily.    levothyroxine (SYNTHROID) 75 MCG tablet Take 75 mcg by mouth once daily.    lisinopril-hydrochlorothiazide (PRINZIDE,ZESTORETIC) 10-12.5 mg per tablet Take 1 tablet by mouth once daily.    metoprolol succinate (TOPROL-XL) 25 MG 24 hr tablet Take 25 mg by mouth once daily.    rivaroxaban (XARELTO) 20 mg Tab Take 20 mg by mouth daily with dinner or evening meal.     Family History     Problem Relation (Age of Onset)    Diabetes Mother        Tobacco Use    Smoking status: Not on file   Substance and Sexual Activity    Alcohol use: Not on file    Drug use: Not on file    Sexual activity: Not on file     Review of Systems   Constitutional: Negative for chills and fever.   HENT: Negative for ear discharge, ear pain, rhinorrhea and sore throat.    Eyes: Negative for pain.   Respiratory: Negative for cough and shortness of breath.    Cardiovascular: Negative for chest pain.   Gastrointestinal: Negative for abdominal pain, constipation, diarrhea, nausea and vomiting.   Genitourinary: Negative for dysuria and hematuria.   Musculoskeletal: Negative for back pain and neck pain.   Skin: Negative for rash.   Neurological: Negative for dizziness and headaches.     Objective:     Vital Signs (Most Recent):  Temp: 99.3 °F (37.4 °C) (03/09/19 0825)  Pulse: 75 (03/09/19 0825)  Resp: 18 (03/09/19 0825)  BP: (!) 107/58 (03/09/19 0825)  SpO2: 97 % (03/09/19 0825) Vital Signs (24h Range):  Temp:  [97.3 °F (36.3 °C)-99.3 °F (37.4 °C)] 99.3 °F (37.4 °C)  Pulse:  [62-99] 75  Resp:  [10-19] 18  SpO2:  [97 %-100 %] 97 %  BP: (105-177)/(50-76)  107/58     Weight: 79.1 kg (174 lb 6.1 oz)  Body mass index is 31.9 kg/m².    Physical Exam  General: Well developed, well nourished female in NAD  HEENT: Conjunctiva clear; Oropharynx clear  Neck: No JVD noted, Supple  CV: irregular rate. S1, S2 with no murmurs,gallops  Resp: Lungs CTA Bilaterally, Unlabored  Abdomen: NTND, BS normoactive x4 quads, soft  Extrem: No cyanosis, clubbing, edema.  Skin: No rashes, lesions, ulcers  Neuro: motor strength in tact. No focal deficit   PSYCH: Oriented x3    Significant Labs:   CBC:   Recent Labs   Lab 03/08/19  1657 03/09/19  0322   WBC 10.06 8.38   HGB 11.5* 11.0*   HCT 35.3* 34.8*    275     CMP:   Recent Labs   Lab 03/08/19  1657 03/09/19  0322    136   K 3.9 3.9    100   CO2 25 24   * 157*   BUN 29* 24*   CREATININE 1.4 1.3   CALCIUM 10.5 10.0   ANIONGAP 11 12   EGFRNONAA 36.3* 39.7*       Significant Imaging: I have reviewed all pertinent imaging results/findings within the past 24 hours.    Assessment/Plan:     Preop examination    Cardiovascular Risk Assessment:  Non-emergent surgery.  No active cardiac problems.  Intermediate risk surgery.  Functional Status: able to climb a flight of stairs (> 4 METS)  Revised cardiac risk index is 3.9%    Recommendation:  - pt has 3.9% risk of major cardiac events.    - continue BP medications   - check EKG              Abnormal finding on breast imaging    - CT done 3/8/19: Scattered soft tissue densities throughout bilateral breasts likely representing breast tissue and/or scattered lymph nodes.  Large focus located in the right inferior breast measuring approximately 1.9 cm.    - recommend annual screening if not already completed.       Adrenal cyst    - CT abdomen 3/8/19: Left adnexal cystic mass measuring approximately 6.5 cm, an abnormal finding in a postmenopausal patient.    - Recommend dedicated ultrasound evaluation per American College of Radiology guidelines.       Hypertension    - continue  home medications. Lisinopril-HCTZ        Hypothyroidism    - continue home levothyroxine          VTE Risk Mitigation (From admission, onward)        Ordered     Place TK hose  Until discontinued      03/08/19 1806     Place sequential compression device  Until discontinued      03/08/19 1806     IP VTE HIGH RISK PATIENT  Once      03/08/19 1806              Thank you for your consult. I will sign off. Please contact us if you have any additional questions.    Nacho Carlos MD  Department of Hospital Medicine   [facility]

## 2019-03-09 NOTE — HPI
"Patient is a 78 yo female with PMHx hypothyroidism, DM, HTN who presents to the ED for evaluation of a brain mass. She was recently seen by her PCP for abnormal behavior. Patient's family at bedside. Reports patient has been "acting weird" over the past month. Reports started with dizziness, patient's family thought she had a UTI, was prescribed cipro. MRI at [facility] shows brain tumor. Sent to [facility] for neurosurgical evaluation. Pt Denies headache, visual changes, seizure activity, recent weight loss, numbness, paresthesias, gait difficulty, b/b incontinence. On Xarelto for new diagnosis of afib.     Hospital medicine consulted for pre-op evaluation. Pt is diabetic but does not take medications for it.she takes Lisinopril-HCTZ for HTN. Per pt, she is able to walk for couple blocks with no chest pain or sob. Also,. Able to go up couple stairs with no chest pain or sob. No kidney issue, Cr <2. According to the family, she was diagnosed with afib based on EKG about four days ago and was started on Toprol xl and Xarelto .     "

## 2019-03-09 NOTE — ASSESSMENT & PLAN NOTE
Patient is a 78 yo female with PMHx hypothyroidism, DM, HTN who presents to the ED for evaluation of a brain mass.    - Neurologically stable.   - Admit to NSGY. Stable for floor status.   - MRI brain w wo contrast reveals right frontal enhancing mass with invasion into corpus callosum, extensive surrounding vasogenic edema and leftward midline shift.   - CT C A P with adrenal mass abnormal for age and breast mass.   - Booked for OR Monday for Craniotomy for tumor resection. To obtain consents. NPO Jonathan night.   - Keppra 500 mg BID  - Will hold starting patient on dexamethasone at this time for possible lymphoma. No complaints of headaches on exam.   - CT chest/abdomen/pelvis ordered for metastatic workup  - MRI Stealth with Synaptive protocol ordered   - Consult placed to hospital medicine for pre-operative clearance  - Resume home meds for hypothyroidism and HTN.  - Hold Xarelto. Coags ordered, stable  - Diabetic diet  - DVT ppx: Compression stockings, SCDs  - Please call with any changes in exam.   - Discussed with Dr. Munoz

## 2019-03-09 NOTE — ASSESSMENT & PLAN NOTE
Cardiovascular Risk Assessment:  Non-emergent surgery.  No active cardiac problems.  Intermediate risk surgery.  Functional Status: able to climb a flight of stairs (> 4 METS)  Revised cardiac risk index is 3.9%    Recommendation:  - pt has 3.9% risk of major cardiac events.    - continue BP medications   - check EKG

## 2019-03-10 NOTE — NURSING
Paged Neuro surgery at 0139. Patient unable to get MRI at 0400 d/t contrast given less than 24 hours ago. Patient will have MRI done around 1800 tomorrow (3/10/19). Awaiting for response.

## 2019-03-10 NOTE — PLAN OF CARE
Problem: Adult Inpatient Plan of Care  Goal: Plan of Care Review  Outcome: Ongoing (interventions implemented as appropriate)  POC reviewed with patient and family. All questions and concerns reviewed. VSS throughout shift. Fall/safety precautions implemented and maintained. Pain management provided. Bed locked in lowest position. Call bell within reach. Will continue to monitor.

## 2019-03-10 NOTE — ANESTHESIA PREPROCEDURE EVALUATION
[facility]  Anesthesia Pre-Operative Evaluation         Patient Name: Jolene Velasquez  YOB: 1940  MRN: [id number]    SUBJECTIVE:     Pre-operative evaluation for Procedure(s) (LRB):  CRANIOTOMY for tumor resection; neuromonitoring (Right)     03/10/2019    Jolene Velasquez is a 79 y.o. female w/ a significant PMHx of T2DM, HTN, hypothyroid, new onset a fib, presenting with behavior changes, dizziness found to have right frontal brain mass. On MRI found to have vasogenic edema and approx 6 mm midline shift. On workup found to have adrenal mass and breast mass.    Patient now presents for the above procedure(s).      LDA:   18 G PIV Rt AC  20 G PIV Lt AC    Prev airway: None documented.    Drips: None documented.      Patient Active Problem List   Diagnosis    Brain mass    Hypothyroidism    Hypertension    Preop examination    Adrenal cyst    Abnormal finding on breast imaging       Review of patient's allergies indicates:  No Known Allergies    Current Inpatient Medications:   levETIRAcetam  500 mg Oral BID    levothyroxine  75 mcg Oral QHS    lisinopril-hydrochlorothiazide  1 tablet Oral QHS    metoprolol succinate  25 mg Oral QHS       No current facility-administered medications on file prior to encounter.      Current Outpatient Medications on File Prior to Encounter   Medication Sig Dispense Refill    ciprofloxacin HCl (CIPRO) 500 MG tablet Take 500 mg by mouth 2 (two) times daily.      levothyroxine (SYNTHROID) 75 MCG tablet Take 75 mcg by mouth once daily.      lisinopril-hydrochlorothiazide (PRINZIDE,ZESTORETIC) 10-12.5 mg per tablet Take 1 tablet by mouth once daily.      metoprolol succinate (TOPROL-XL) 25 MG 24 hr tablet Take 25 mg by mouth once daily.      rivaroxaban (XARELTO) 20 mg Tab Take 20 mg by mouth daily with dinner or evening meal.         History reviewed. No pertinent surgical history.    Social History     Socioeconomic History    Marital  status:      Spouse name: Not on file    Number of children: Not on file    Years of education: Not on file    Highest education level: Not on file   Social Needs    Financial resource strain: Not on file    Food insecurity - worry: Not on file    Food insecurity - inability: Not on file    Transportation needs - medical: Not on file    Transportation needs - non-medical: Not on file   Occupational History    Not on file   Tobacco Use    Smoking status: Never Smoker    Smokeless tobacco: Never Used   Substance and Sexual Activity    Alcohol use: Not on file    Drug use: Not on file    Sexual activity: Not on file   Other Topics Concern    Not on file   Social History Narrative    Not on file       OBJECTIVE:     Vital Signs Range (Last 24H):  Temp:  [36.2 °C (97.2 °F)-36.8 °C (98.2 °F)]   Pulse:  [53-91]   Resp:  [17-18]   BP: (103-124)/(52-59)   SpO2:  [94 %-98 %]       Significant Labs:  Lab Results   Component Value Date    WBC 8.99 03/10/2019    HGB 11.3 (L) 03/10/2019    HCT 34.7 (L) 03/10/2019     03/10/2019     03/10/2019    K 4.2 03/10/2019     03/10/2019    CREATININE 1.4 03/10/2019    BUN 32 (H) 03/10/2019    CO2 25 03/10/2019    INR 1.1 03/08/2019       Diagnostic Studies:   MRI Brain 3/8/19  4.3 x 3.4 cm enhancing necrotic mass at the paramidline region of the right frontal lobe with invasion of the corpus callosum, extensive right frontal lobe vasogenic edema and an approximately 6 mm leftward midline shift.  A high-grade primary brain neoplasm, specifically glioblastoma, leads the diagnostic considerations    Ct chest abd 3/8/19    Left adnexal cystic mass measuring approximately 6.5 cm, an abnormal finding in a postmenopausal patient.  Recommend dedicated ultrasound evaluation per [employer] College [employer] guidelines.    Scattered soft tissue densities throughout bilateral breasts likely representing breast tissue and/or scattered lymph nodes.  Large focus  located in the right inferior breast measuring approximately 1.9 cm.  No record of mammographic studies within the extra system; recommend annual screening if not already completed.    Cholelithiasis without findings to suggest acute cholecystitis.    Colonic diverticulosis without diverticulitis.      EKG:   3/9/19  NSR    2D ECHO:  No results found for this or any previous visit.      ASSESSMENT/PLAN:       Anesthesia Evaluation         Review of Systems  Anesthesia Hx:  Neg history of prior surgery. Denies Family Hx of Anesthesia complications.  Denies Personal Hx of Anesthesia complications.   Social:  Non-Smoker    Cardiovascular:   Hypertension    Pulmonary:  Pulmonary Normal    Hepatic/GI:  Hepatic/GI Normal    Neurological:   Brain mass   Endocrine:   Diabetes, well controlled, type 2 Hypothyroidism        Physical Exam  General:  Well nourished    Airway/Jaw/Neck:  Airway Findings: Mouth Opening: Normal Tongue: Normal  Mallampati: II        Eyes/Ears/Nose:  EYES/EARS/NOSE FINDINGS: Normal   Dental:  Dental Findings:    Chest/Lungs:  Chest/Lungs Findings: Clear to auscultation, Normal Respiratory Rate     Heart/Vascular:  Heart Findings: Rate: Normal  Rhythm: Regular Rhythm  Sounds: Normal     Abdomen:  Abdomen Findings: Normal    Musculoskeletal:  Musculoskeletal Findings: Normal   Skin:  Skin Findings: Normal    Mental Status:  Mental Status Findings: Normal        Anesthesia Plan  Type of Anesthesia, risks & benefits discussed:  Anesthesia Type:  general  Patient's Preference:   Intra-op Monitoring Plan: standard ASA monitors and arterial line  Intra-op Monitoring Plan Comments:   Post Op Pain Control Plan: multimodal analgesia and per primary service following discharge from PACU  Post Op Pain Control Plan Comments:   Induction:   IV  Beta Blocker:  Patient is on a Beta-Blocker and has received one dose within the past 24 hours (No further documentation required).       Informed Consent: Patient  understands risks and agrees with Anesthesia plan.  Questions answered. Anesthesia consent signed with patient.  ASA Score: 3     Day of Surgery Review of History & Physical: I have interviewed and examined the patient. I have reviewed the patient's H&P dated:  There are no significant changes.  H&P update referred to the surgeon.         Ready For Surgery From Anesthesia Perspective.

## 2019-03-10 NOTE — SUBJECTIVE & OBJECTIVE
Interval History: 3/10: consent for surgery tomorrow. MRI stealth complete, remains stable on the floor.     Medications:  Continuous Infusions:  Scheduled Meds:   levETIRAcetam  500 mg Oral BID    levothyroxine  75 mcg Oral QHS    lisinopril-hydrochlorothiazide  1 tablet Oral QHS    metoprolol succinate  25 mg Oral QHS     PRN Meds:acetaminophen, dextrose 50%, dextrose 50%, glucagon (human recombinant), glucose, glucose, glucose, hydrALAZINE, insulin aspart U-100, ondansetron     Review of Systems  Objective:     Weight: 79.1 kg (174 lb 6.1 oz)  Body mass index is 31.9 kg/m².  Vital Signs (Most Recent):  Temp: 97.5 °F (36.4 °C) (03/10/19 1051)  Pulse: 88 (03/10/19 1051)  Resp: 18 (03/10/19 1051)  BP: (!) 132/59 (03/10/19 1051)  SpO2: 100 % (03/10/19 1051) Vital Signs (24h Range):  Temp:  [97.2 °F (36.2 °C)-98.2 °F (36.8 °C)] 97.5 °F (36.4 °C)  Pulse:  [53-91] 88  Resp:  [17-18] 18  SpO2:  [94 %-100 %] 100 %  BP: (103-132)/(52-59) 132/59                           Neurosurgery Physical Exam  A+Ox3, PERRL EOMI no facial droop, tongue midline  JONES AG, 5/5 strength in all extremities   SILT  No pronator drift, no dysmetria      Significant Labs:  Recent Labs   Lab 03/08/19 1657 03/09/19 0322 03/10/19  0438   * 157* 145*    136 137   K 3.9 3.9 4.2    100 101   CO2 25 24 25   BUN 29* 24* 32*   CREATININE 1.4 1.3 1.4   CALCIUM 10.5 10.0 10.3     Recent Labs   Lab 03/08/19 1657 03/09/19  0322 03/10/19  0438   WBC 10.06 8.38 8.99   HGB 11.5* 11.0* 11.3*   HCT 35.3* 34.8* 34.7*    275 282     Recent Labs   Lab 03/08/19  1657   INR 1.1   APTT 24.0     Microbiology Results (last 7 days)     ** No results found for the last 168 hours. **        Recent Lab Results       03/10/19  0438        Immature Granulocytes 0.2     Immature Grans (Abs) 0.02  Comment:  Mild elevation in immature granulocytes is non specific and   can be seen in a variety of conditions including stress response,   acute  inflammation, trauma and pregnancy. Correlation with other   laboratory and clinical findings is essential.       Anion Gap 11     Baso # 0.05     Basophil% 0.6     BUN, Bld 32     Calcium 10.3     Chloride 101     CO2 25     Creatinine 1.4     Differential Method Automated     eGFR if African American 41.8     eGFR if non  36.3  Comment:  Calculation used to obtain the estimated glomerular filtration  rate (eGFR) is the CKD-EPI equation.        Eos # 0.1     Eosinophil% 1.2     Glucose 145     Gran # (ANC) 4.6     Gran% 50.7     Hematocrit 34.7     Hemoglobin 11.3     Lymph # 3.6     Lymph% 39.5     MCH 31.2     MCHC 32.6     MCV 96     Mono # 0.7     Mono% 7.8     MPV 10.9     nRBC 0     Platelets 282     Potassium 4.2     RBC 3.62     RDW 12.5     Sodium 137     WBC 8.99           Significant Diagnostics:  CT: No results found in the last 24 hours.  MRI: No results found in the last 24 hours.

## 2019-03-10 NOTE — PROGRESS NOTES
"[facility]  Neurosurgery  Progress Note    Subjective:     History of Present Illness: Patient is a 78 yo female with PMHx hypothyroidism, DM, HTN who presents to the ED for evaluation of a brain mass. She was recently seen by her PCP for abnormal behavior. Patient's family at bedside. Reports patient has been "acting weird" over the past month. Reports started with dizziness, patient's family thought she had a UTI, was prescribed cipro. Family states over the past month she has exhibited odd behavior such as leaving the front door open and lights on when she was sleeping, skipping showers, difficulty dressing herself. MRI at [facility] shows brain tumor. Sent to [facility] for neurosurgical evaluation. Patient alert and oriented on exam. Participates fully. Denies headache, visual changes, seizure activity, recent weight loss, numbness, paresthesias, gait difficulty, b/b incontinence. On Xarelto, patient took last night.         Post-Op Info:  Procedure(s) (LRB):  CRANIOTOMY for tumor resection; neuromonitoring (Right)         Interval History: 3/10: consent for surgery tomorrow. MRI stealth complete, remains stable on the floor.     Medications:  Continuous Infusions:  Scheduled Meds:   levETIRAcetam  500 mg Oral BID    levothyroxine  75 mcg Oral QHS    lisinopril-hydrochlorothiazide  1 tablet Oral QHS    metoprolol succinate  25 mg Oral QHS     PRN Meds:acetaminophen, dextrose 50%, dextrose 50%, glucagon (human recombinant), glucose, glucose, glucose, hydrALAZINE, insulin aspart U-100, ondansetron     Review of Systems  Objective:     Weight: 79.1 kg (174 lb 6.1 oz)  Body mass index is 31.9 kg/m².  Vital Signs (Most Recent):  Temp: 97.5 °F (36.4 °C) (03/10/19 1051)  Pulse: 88 (03/10/19 1051)  Resp: 18 (03/10/19 1051)  BP: (!) 132/59 (03/10/19 1051)  SpO2: 100 % (03/10/19 1051) Vital Signs (24h Range):  Temp:  [97.2 °F (36.2 °C)-98.2 °F (36.8 °C)] 97.5 °F (36.4 °C)  Pulse:  " [53-91] 88  Resp:  [17-18] 18  SpO2:  [94 %-100 %] 100 %  BP: (103-132)/(52-59) 132/59                           Neurosurgery Physical Exam  A+Ox3, PERRL EOMI no facial droop, tongue midline  JONES AG, 5/5 strength in all extremities   SILT  No pronator drift, no dysmetria      Significant Labs:  Recent Labs   Lab 03/08/19  1657 03/09/19  0322 03/10/19  0438   * 157* 145*    136 137   K 3.9 3.9 4.2    100 101   CO2 25 24 25   BUN 29* 24* 32*   CREATININE 1.4 1.3 1.4   CALCIUM 10.5 10.0 10.3     Recent Labs   Lab 03/08/19  1657 03/09/19  0322 03/10/19  0438   WBC 10.06 8.38 8.99   HGB 11.5* 11.0* 11.3*   HCT 35.3* 34.8* 34.7*    275 282     Recent Labs   Lab 03/08/19 1657   INR 1.1   APTT 24.0     Microbiology Results (last 7 days)     ** No results found for the last 168 hours. **        Recent Lab Results       03/10/19  0438        Immature Granulocytes 0.2     Immature Grans (Abs) 0.02  Comment:  Mild elevation in immature granulocytes is non specific and   can be seen in a variety of conditions including stress response,   acute inflammation, trauma and pregnancy. Correlation with other   laboratory and clinical findings is essential.       Anion Gap 11     Baso # 0.05     Basophil% 0.6     BUN, Bld 32     Calcium 10.3     Chloride 101     CO2 25     Creatinine 1.4     Differential Method Automated     eGFR if African American 41.8     eGFR if non  36.3  Comment:  Calculation used to obtain the estimated glomerular filtration  rate (eGFR) is the CKD-EPI equation.        Eos # 0.1     Eosinophil% 1.2     Glucose 145     Gran # (ANC) 4.6     Gran% 50.7     Hematocrit 34.7     Hemoglobin 11.3     Lymph # 3.6     Lymph% 39.5     MCH 31.2     MCHC 32.6     MCV 96     Mono # 0.7     Mono% 7.8     MPV 10.9     nRBC 0     Platelets 282     Potassium 4.2     RBC 3.62     RDW 12.5     Sodium 137     WBC 8.99           Significant Diagnostics:  CT: No results found in the last 24  hours.  MRI: No results found in the last 24 hours.    Assessment/Plan:     Brain mass    Patient is a 78 yo female with PMHx hypothyroidism, DM, HTN who presents to the ED for evaluation of a brain mass.    - Neurologically stable.   - Admit to NSGY. Stable for floor status.   - MRI brain w wo contrast reveals right frontal enhancing mass with invasion into corpus callosum, extensive surrounding vasogenic edema and leftward midline shift.   - CT C A P with adrenal mass abnormal for age and breast mass.   - Booked for OR Monday for Craniotomy for tumor resection. To obtain consents. NPO Jonathan night.   - Keppra 500 mg BID  - Will hold starting patient on dexamethasone at this time for possible lymphoma. No complaints of headaches on exam.   - CT chest/abdomen/pelvis ordered for metastatic workup  - MRI Stealth with Synaptive protocol ordered   - Consult placed to hospital medicine for pre-operative clearance  - Resume home meds for hypothyroidism and HTN.  - Hold Xarelto. Coags ordered, stable  - Diabetic diet  - DVT ppx: Compression stockings, SCDs  - Please call with any changes in exam.   - Discussed with Dr. Tammy Camarena MD  Neurosurgery  [facility]

## 2019-03-11 PROBLEM — E11.9 TYPE 2 DIABETES MELLITUS: Status: ACTIVE | Noted: 2019-01-01

## 2019-03-11 NOTE — NURSING
Patient leaving unit via central transportation for surgery. No questions at this time. Denies needs. No acute distress noted.

## 2019-03-11 NOTE — SUBJECTIVE & OBJECTIVE
Interval History:   NAEON. Patient resting comfortably in bed. Family at bedside. Denies headache, dizziness, N/V, vision changes, weakness, or seizure like activity. NPO since midnight.     Medications:  Continuous Infusions:   sodium chloride 0.9%       Scheduled Meds:   dexamethasone  4 mg Oral Q6H    levETIRAcetam  500 mg Oral BID    levothyroxine  75 mcg Oral QHS    lisinopril-hydrochlorothiazide  1 tablet Oral QHS    metoprolol succinate  25 mg Oral QHS     PRN Meds:acetaminophen, dextrose 50%, dextrose 50%, glucagon (human recombinant), glucose, glucose, glucose, hydrALAZINE, insulin aspart U-100, ondansetron     Review of Systems  Objective:     Weight: 79.1 kg (174 lb 6.1 oz)  Body mass index is 31.9 kg/m².  Vital Signs (Most Recent):  Temp: 97.3 °F (36.3 °C) (03/11/19 0402)  Pulse: 62 (03/11/19 0724)  Resp: 17 (03/11/19 0402)  BP: (!) 126/59 (03/11/19 0402)  SpO2: 99 % (03/11/19 0402) Vital Signs (24h Range):  Temp:  [97.2 °F (36.2 °C)-97.9 °F (36.6 °C)] 97.3 °F (36.3 °C)  Pulse:  [58-88] 62  Resp:  [17-19] 17  SpO2:  [93 %-100 %] 99 %  BP: (110-132)/(55-75) 126/59        Neurosurgery Physical Exam  General: well developed, well nourished, no distress, poor dentition.   Head: normocephalic, atraumatic  Neurologic: Alert and oriented. Thought content appropriate.  GCS: Motor: 6/Verbal: 5/Eyes: 4 GCS Total: 15  Mental Status: Awake, Alert, Oriented x 4  Language: No aphasia  Speech: No dysarthria  Cranial nerves: face symmetric, tongue midline, CN II-XII grossly intact.   Eyes: pupils equal, round, reactive to light with accomodation, EOMI.   Pulmonary: normal respirations, no signs of respiratory distress  Abdomen: soft, non-distended, not tender to palpation  Skin: Skin is warm, dry and intact.  Sensory: intact to light touch throughout  Motor Strength:Moves all extremities spontaneously with good tone.  Full strength upper and lower extremities. No abnormal movements seen.   Babinski's:  Negative.  Clonus: Negative.  Finger-to-nose movements normal.   Pronator drift: intact bilaterally       Significant Labs:  Recent Labs   Lab 03/10/19  0438 03/11/19  0309   * 153*    138   K 4.2 4.1    104   CO2 25 21*   BUN 32* 41*   CREATININE 1.4 1.5*   CALCIUM 10.3 10.0     Recent Labs   Lab 03/10/19  0438 03/11/19  0309   WBC 8.99 9.07   HGB 11.3* 11.1*   HCT 34.7* 34.9*    266     Recent Labs   Lab 03/11/19  0310   INR 1.0   APTT 22.0

## 2019-03-11 NOTE — PROGRESS NOTES
"[facility]  Neurosurgery  Progress Note    Subjective:     History of Present Illness: Ms. Jolene Velasquez is a 80 yo female with PMHx hypothyroidism, DM, HTN who presents to the ED for evaluation of a brain mass. She was recently seen by her PCP for abnormal behavior. Patient's family at bedside. Reports patient has been "acting weird" over the past month. Reports started with dizziness, patient's family thought she had a UTI, was prescribed cipro. Family states over the past month she has exhibited odd behavior such as leaving the front door open and lights on when she was sleeping, skipping showers, difficulty dressing herself. MRI at [facility] shows brain tumor. Sent to [facility] for neurosurgical evaluation. Patient alert and oriented on exam. Participates fully. Denies headache, visual changes, seizure activity, recent weight loss, numbness, paresthesias, gait difficulty, b/b incontinence. On Xarelto, patient took last night.         Post-Op Info:  Procedure(s) (LRB):  CRANIOTOMY for tumor resection; neuromonitoring (Right)         Interval History:   NAEON. Patient resting comfortably in bed. Family at bedside. Denies headache, dizziness, N/V, vision changes, weakness, or seizure like activity. NPO since midnight.     Medications:  Continuous Infusions:   sodium chloride 0.9%       Scheduled Meds:   dexamethasone  4 mg Oral Q6H    levETIRAcetam  500 mg Oral BID    levothyroxine  75 mcg Oral QHS    lisinopril-hydrochlorothiazide  1 tablet Oral QHS    metoprolol succinate  25 mg Oral QHS     PRN Meds:acetaminophen, dextrose 50%, dextrose 50%, glucagon (human recombinant), glucose, glucose, glucose, hydrALAZINE, insulin aspart U-100, ondansetron     Review of Systems  Objective:     Weight: 79.1 kg (174 lb 6.1 oz)  Body mass index is 31.9 kg/m².  Vital Signs (Most Recent):  Temp: 97.3 °F (36.3 °C) (03/11/19 0402)  Pulse: 62 (03/11/19 0724)  Resp: 17 (03/11/19 " 0402)  BP: (!) 126/59 (03/11/19 0402)  SpO2: 99 % (03/11/19 0402) Vital Signs (24h Range):  Temp:  [97.2 °F (36.2 °C)-97.9 °F (36.6 °C)] 97.3 °F (36.3 °C)  Pulse:  [58-88] 62  Resp:  [17-19] 17  SpO2:  [93 %-100 %] 99 %  BP: (110-132)/(55-75) 126/59        Neurosurgery Physical Exam  General: well developed, well nourished, no distress, poor dentition.   Head: normocephalic, atraumatic  Neurologic: Alert and oriented. Thought content appropriate.  GCS: Motor: 6/Verbal: 5/Eyes: 4 GCS Total: 15  Mental Status: Awake, Alert, Oriented x 4  Language: No aphasia  Speech: No dysarthria  Cranial nerves: face symmetric, tongue midline, CN II-XII grossly intact.   Eyes: pupils equal, round, reactive to light with accomodation, EOMI.   Pulmonary: normal respirations, no signs of respiratory distress  Abdomen: soft, non-distended, not tender to palpation  Skin: Skin is warm, dry and intact.  Sensory: intact to light touch throughout  Motor Strength:Moves all extremities spontaneously with good tone.  Full strength upper and lower extremities. No abnormal movements seen.   Babinski's: Negative.  Clonus: Negative.  Finger-to-nose movements normal.   Pronator drift: intact bilaterally       Significant Labs:  Recent Labs   Lab 03/10/19  0438 03/11/19  0309   * 153*    138   K 4.2 4.1    104   CO2 25 21*   BUN 32* 41*   CREATININE 1.4 1.5*   CALCIUM 10.3 10.0     Recent Labs   Lab 03/10/19  0438 03/11/19  0309   WBC 8.99 9.07   HGB 11.3* 11.1*   HCT 34.7* 34.9*    266     Recent Labs   Lab 03/11/19  0310   INR 1.0   APTT 22.0         Assessment/Plan:     * Brain mass    79 year old female with a PMHx hypothyroidism, DM, HTN who presents to the ED for evaluation of a brain mass.    -Patient neurologically stable on exam  -OR today for craniotomy for tumor resection with Dr. Villalobos  -Continue Keppra for seizure prevention  -Begin Decadron 4 mg q 6 hours for cerebral edema. Begin Famotidine for PPI.   -Patient  seen by Dr. Villalobos, who reviewed all of the risks of the procedure. All of their questions were answered.   -NPO. Begin IVF's.          Discussed with Dr. Villalobos  Please call with any questions      Lindsay Pelletier PA-C   Neurosurgery   Pager: [phone]

## 2019-03-11 NOTE — ASSESSMENT & PLAN NOTE
79 year old female with a PMHx hypothyroidism, DM, HTN who presents to the ED for evaluation of a brain mass.    -Patient neurologically stable on exam  -OR today for craniotomy for tumor resection with Dr. Villalobos  -Continue Keppra for seizure prevention  -Begin Decadron 4 mg q 6 hours for cerebral edema. Begin Famotidine for PPI.   -Patient seen by Dr. Villalobos, who reviewed all of the risks of the procedure. All of their questions were answered.   -NPO. Begin IVF's.

## 2019-03-11 NOTE — PLAN OF CARE
Problem: Adult Inpatient Plan of Care  Goal: Plan of Care Review  Outcome: Ongoing (interventions implemented as appropriate)  POC reviewed with patient and family. All questions and concerns reviewed. VSS throughout shift. Fall/safety precautions implemented and maintained. In good spirits. Bed locked in lowest position. Call bell within reach. Will continue to monitor.

## 2019-03-11 NOTE — PLAN OF CARE
CM met with patients family to obtain discharge planning assessment.  Patient currently in surgery and will go to ICU.  Planned discharge is home - Plan (A) or home with home health - Plan (B).    PCP:  Cortez Dave MD     Payor:  Payor: MEDICARE / Plan: MEDICARE PART A & B / Product Type: Government /      Pharmacy:    [facility]  [place], [place] - [address] &  [address]  [place] [address]  Phone: [phone] Fax: [phone]       03/11/19 1733   Discharge Assessment   Assessment Type Discharge Planning Assessment   Confirmed/corrected address and phone number on facesheet? Yes   Assessment information obtained from? Other  (family, patient in surgery)   Expected Length of Stay (days) 5   Communicated expected length of stay with patient/caregiver no   Prior to hospitilization cognitive status: Alert/Oriented   Prior to hospitalization functional status: Independent   Current cognitive status: Alert/Oriented   Current Functional Status: Independent   Lives With alone   Able to Return to Prior Arrangements yes   Is patient able to care for self after discharge? Yes   Who are your caregiver(s) and their phone number(s)? Eloisa Velasquez - daughter [phone]   Patient's perception of discharge disposition home or selfcare   Readmission Within the Last 30 Days no previous admission in last 30 days   Patient currently being followed by outpatient case management? No   Patient currently receives any other outside agency services? No   Equipment Currently Used at Home none   Do you have any problems affording any of your prescribed medications? No   Is the patient taking medications as prescribed? yes   Does the patient have transportation home? Yes   Transportation Anticipated family or friend will provide   Does the patient receive services at the [facility]? No   Discharge Plan A Home   Discharge Plan B Home;Home Health   DME Needed Upon  Discharge  none   Patient/Family in Agreement with Plan yes

## 2019-03-11 NOTE — PLAN OF CARE
SW following for DC needs. SW in communication with CM.    Needs to be determined.     Madina Coughlin, ROSITA  [facility]  [id number]

## 2019-03-12 NOTE — ASSESSMENT & PLAN NOTE
SBP goal 100-140  - Hold home ACEI/HCTZ combo in immediate post-op setting  - Continue home metoprolol  - PRN IV hydralazine

## 2019-03-12 NOTE — SUBJECTIVE & OBJECTIVE
Interval History: TEDDY ON, AF VSS. Discontinued hoang, stable for transfer to the floor under neurosurgery service.  POD 1 s/p craniotomy for right frontal brain mass.     Medications:  Continuous Infusions:  Scheduled Meds:   bacitracin   Topical (Top) BID    dexamethasone  4 mg Oral Q6H    famotidine  20 mg Oral Daily    levETIRAcetam  500 mg Oral BID    levothyroxine  75 mcg Oral QHS    metoprolol succinate  25 mg Oral QHS    mupirocin  1 g Nasal BID    senna-docusate 8.6-50 mg  1 tablet Oral Daily     PRN Meds:acetaminophen, dextrose 50%, dextrose 50%, glucagon (human recombinant), glucose, glucose, glucose, hydrALAZINE, HYDROcodone-acetaminophen, insulin aspart U-100, ondansetron, sodium chloride 0.9%     Review of Systems  Objective:     Weight: 83.3 kg (183 lb 10.3 oz)  Body mass index is 33.59 kg/m².  Vital Signs (Most Recent):  Temp: 98.4 °F (36.9 °C) (03/12/19 1100)  Pulse: 75 (03/12/19 1300)  Resp: 12 (03/12/19 1300)  BP: (!) 109/49 (03/12/19 1300)  SpO2: 98 % (03/12/19 1300) Vital Signs (24h Range):  Temp:  [97.7 °F (36.5 °C)-98.9 °F (37.2 °C)] 98.4 °F (36.9 °C)  Pulse:  [58-89] 75  Resp:  [12-32] 12  SpO2:  [93 %-100 %] 98 %  BP: (103-162)/(49-70) 109/49     Date 03/12/19 0700 - 03/13/19 0659   Shift 0039-9278 0608-4040 5274-3325 24 Hour Total   INTAKE   P.O. 650   650   I.V.(mL/kg) 400(4.8)   400(4.8)   Shift Total(mL/kg) 1050(12.6)   1050(12.6)   OUTPUT   Urine(mL/kg/hr) 300   300   Shift Total(mL/kg) 300(3.6)   300(3.6)   Weight (kg) 83.3 83.3 83.3 83.3                        Neurosurgery Physical Exam    PERRL, EOMI   no pronator drift   Alert and oriented  JONES AG  SILT  Reflexes normoactive    Significant Labs:  Recent Labs   Lab 03/11/19 0309 03/12/19  0110   * 197*  197*    137  137   K 4.1 4.9  4.9    106  106   CO2 21* 22*  22*   BUN 41* 30*  30*   CREATININE 1.5* 1.2  1.2   CALCIUM 10.0 9.3  9.3   MG  --  1.4*     Recent Labs   Lab 03/11/19 0309  03/12/19  0110   WBC 9.07 10.97   HGB 11.1* 11.8*   HCT 34.9* 35.5*    254     Recent Labs   Lab 03/11/19  0310   INR 1.0   APTT 22.0     Microbiology Results (last 7 days)     ** No results found for the last 168 hours. **        Recent Lab Results       03/12/19  1107   03/12/19  0738   03/12/19  0110   03/11/19  2026   03/11/19  1353        Immature Granulocytes     0.4         Immature Grans (Abs)     0.04  Comment:  Mild elevation in immature granulocytes is non specific and   can be seen in a variety of conditions including stress response,   acute inflammation, trauma and pregnancy. Correlation with other   laboratory and clinical findings is essential.           Albumin     3.1         Alkaline Phosphatase     43         ALT     59         Anion Gap     9              9         AST     43         Baso #     0.01         Basophil%     0.1         Total Bilirubin     0.3  Comment:  For infants and newborns, interpretation of results should be based  on gestational age, weight and in agreement with clinical  observations.  Premature Infant recommended reference ranges:  Up to 24 hours.............<8.0 mg/dL  Up to 48 hours............<12.0 mg/dL  3-5 days..................<15.0 mg/dL  6-29 days.................<15.0 mg/dL           BUN, Bld     30              30         Calcium     9.3              9.3         Chloride     106              106         CO2     22              22         Creatinine     1.2              1.2         Differential Method     Automated         eGFR if      50.4              50.4         eGFR if non      43.7  Comment:  Calculation used to obtain the estimated glomerular filtration  rate (eGFR) is the CKD-EPI equation.                 43.7  Comment:  Calculation used to obtain the estimated glomerular filtration  rate (eGFR) is the CKD-EPI equation.            Eos #     0.0         Eosinophil%     0.0         Estimated Avg Glucose       131        Glucose     197              197         Gran # (ANC)     9.5         Gran%     86.1         Hematocrit     35.5         Hemoglobin     11.8         Hemoglobin A1C External       6.2  Comment:  ADA Screening Guidelines:  5.7-6.4%  Consistent with prediabetes  >or=6.5%  Consistent with diabetes  High levels of fetal hemoglobin interfere with the HbA1C  assay. Heterozygous hemoglobin variants (HbS, HgC, etc)do  not significantly interfere with this assay.   However, presence of multiple variants may affect accuracy.         Lymph #     1.2         Lymph%     10.8         Magnesium     1.4         MCH     31.5         MCHC     33.2         MCV     95         Mono #     0.3         Mono%     2.6         MPV     10.2         nRBC     0         Phosphorus     3.3         Platelets     254         POCT Glucose 255 195     125     Potassium     4.9              4.9         Total Protein     7.2         RBC     3.75         RDW     12.2         Sodium     137              137         WBC     10.97                              Significant Diagnostics:  CT: No results found in the last 24 hours.  MRI: Mri Brain W Wo Contrast    Result Date: 3/12/2019  Status post right frontal lobe tumor resection, as above.  There is some enhancement surrounding the resection cavity, so residual tumor could not be excluded.  Follow-up scans including perfusion imaging will be useful to differentiate residual/recurrent tumor and postoperative changes. Electronically signed by: Camacho Mera MD Date:    03/12/2019 Time:    08:07

## 2019-03-12 NOTE — PLAN OF CARE
Problem: Adult Inpatient Plan of Care  Goal: Plan of Care Review  Outcome: Ongoing (interventions implemented as appropriate)  POC reviewed with pt at 1345, verbalized understanding of the POC. Pt had OOB, sits at the edge of the bed when eating, and uses bedside commode with minimal assist. No complaints of pain. Bed alarm on. Surgical incision at the head intact.  Accuchecks AC and HS with ISS coverage. Pt has transfer orders to step down unit. Questions and concerns addressed. Pt progressing towards goals of care. Please see flow sheet for detailed nursing assessment and VS. Will continue to monitor.

## 2019-03-12 NOTE — PROGRESS NOTES
Patient arrived to [facility] room 7997 from OR for R side crani    Type of stroke/diagnosis: brain mass resection    TPA start and end time- N/A    Thrombectomy start and end time- N/A    Current symptoms: left sided weakness post op    Skin assessment done: Y  Wounds noted: none    NCC notified: ILEANA Spicer

## 2019-03-12 NOTE — PROGRESS NOTES
[facility]  Neurocritical Care  Progress Note    Admit Date: 3/8/2019  Service Date: 03/12/2019  Length of Stay: 4    Subjective:     Chief Complaint: Brain mass    History of Present Illness: History obtained exclusively from chart as patient post-anesthesia and unable to provide history. Patient initially presented on 3/8 to [facility] for evaluation of brain mass. She had a one month history of confusion, HAs, and personality changes. MRI from OSH revealed brain tumor and she was transferred to [facility] for NSGY evaluation. She was admitted to their service on the 8th and is now post-op from craniotomy for R frontal tumor resection. Patient will be admitted to [facility] for post-op monitoring.    PMH significant for DM, HTN and hypothyroidism.         Hospital Course: 3/11: R frontal tumor resection  3/12: transfer to [facility]    Interval History: POD 1, transfer to [facility].  Discussed with team.  Begin insulin with meals while on steroids.      Review of Systems: Review of Systems   Constitutional: Negative for chills and fever.   Eyes: Negative for blurred vision, double vision and photophobia.   Respiratory: Negative for shortness of breath, wheezing and stridor.    Cardiovascular: Negative for chest pain and palpitations.   Gastrointestinal: Negative for nausea and vomiting.   Genitourinary: Negative for frequency and urgency.   Musculoskeletal: Positive for myalgias.   Skin: Negative for itching and rash.   Neurological: Negative for dizziness and headaches.   Psychiatric/Behavioral: The patient is not nervous/anxious and does not have insomnia.          Vitals:   Temp: 98.4 °F (36.9 °C)  Pulse: 75  Rhythm: normal sinus rhythm  BP: (!) 109/49  MAP (mmHg): 70  Resp: 12  SpO2: 98 %  O2 Device (Oxygen Therapy): room air    Temp  Min: 97.7 °F (36.5 °C)  Max: 98.9 °F (37.2 °C)  Pulse  Min: 58  Max: 89  BP  Min: 103/50  Max: 162/70  MAP (mmHg)  Min: 70  Max: 101  Resp  Min: 12  Max: 32  SpO2  Min: 93 %  Max: 100  %    03/11 0701 - 03/12 0700  In: 5883.3 [P.O.:250; I.V.:5633.3]  Out: 1460 [Urine:1460]   Unmeasured Output  Urine Occurrence: 1  Stool Occurrence: 0     Examination:   Constitutional: Well-nourished and -developed. No apparent distress.   Eyes: Conjunctiva clear, anicteric. Lids no lesions.  Head/Ears/Nose/Mouth/Throat/Neck: Moist mucous membranes. External ears, nose atraumatic. R frontal incision CDI  Cardiovascular: Regular rhythm. No murmurs. No leg edema.  Respiratory: Comfortable respirations. Clear to auscultation.  Gastrointestinal: No hernia. Soft, nondistended, nontender. + bowel sounds.    Neurologic:  -GCS [id number]  -Alert. Oriented to person, place, and time. Speech fluent. Follows commands.  -Motor 5/5 throughout, no drift   -sensation intact  -PERRL, EOMI    Medications:   Continuous Scheduled  bacitracin  BID   dexamethasone 4 mg Q6H   famotidine 20 mg Daily   insulin aspart U-100 3 Units TIDWM   levETIRAcetam 500 mg BID   levothyroxine 75 mcg QHS   metoprolol succinate 25 mg QHS   mupirocin 1 g BID   senna-docusate 8.6-50 mg 1 tablet Daily   PRN  acetaminophen 650 mg Q6H PRN   dextrose 50% 12.5 g PRN   dextrose 50% 25 g PRN   glucagon (human recombinant) 1 mg PRN   glucose 16 g PRN   glucose 16 g PRN   glucose 24 g PRN   hydrALAZINE 10 mg Q6H PRN   HYDROcodone-acetaminophen 1 tablet Q4H PRN   insulin aspart U-100 1-10 Units QID (AC + HS) PRN   ondansetron 4 mg Q6H PRN   sodium chloride 0.9% 3 mL PRN      Today I independently reviewed pertinent medications, lines/drains/airways, imaging, cardiology results, laboratory results, microbiology results,    ISTAT: No results for input(s): PH, PCO2, PO2, POCSATURATED, HCO3, BE, POCNA, POCK, POCTCO2, POCGLU, POCICA, POCLAC, SAMPLE in the last 24 hours.   Chem: Recent Labs   Lab 03/12/19  0110     137   K 4.9  4.9     106   CO2 22*  22*   *  197*   BUN 30*  30*   CREATININE 1.2  1.2   ESTGFRAFRICA 50.4*  50.4*   EGFRNONAA 43.7*   43.7*   CALCIUM 9.3  9.3   MG 1.4*   PHOS 3.3   ANIONGAP 9  9   PROT 7.2   ALBUMIN 3.1*   BILITOT 0.3   ALKPHOS 43*   AST 43*   ALT 59*     Heme: Recent Labs   Lab 03/12/19  0110   WBC 10.97   HGB 11.8*   HCT 35.5*        Endo:   Recent Labs   Lab 03/11/19  1353 03/12/19  0738 03/12/19  1107   POCTGLUCOSE 125* 195* 255*          Assessment/Plan:     Neuro   * Brain mass    POD1 s/p craniotomy for resection of R frontal mass  - Admit to [facility] for post-op monitoring  - SBP goal <140  - Dexamethasone 4 mg q6h  - Keppra seizure ppx  - HOB 30   - Pain control with PRN norco  - Post-op MRI complete  - PT/OT/SLP  - Unclear why patient on full AC at home, however will hold in immediate post-op setting.   - transfer to [facility] today     Cardiac/Vascular   Hypertension    SBP goal 100-140  - Hold home ACEI/HCTZ combo in immediate post-op setting  - Continue home metoprolol  - PRN IV hydralazine      Endocrine   Type 2 diabetes mellitus    - Hemoglobin A1c 6.2  - aspart 3 units tidwm  - SSI     Hypothyroidism    - Continue levothyroxine            The patient is being Prophylaxed for:  Venous Thromboembolism with: Mechanical  Stress Ulcer with: H2B  Ventilator Pneumonia with: not applicable    Activity Orders          None        Full Code    Laura Loiuse PA-C  Neurocritical Care  [facility]

## 2019-03-12 NOTE — OP NOTE
DATE OF PROCEDURE:  03/11/2019    SURGEON:  Nathan Villalobos M.D., Ph.D.    ASSISTANT:  Gordon Rodriguez M.D. (RES) (the assistant is a [facility] resident).    PREOPERATIVE DIAGNOSES:  1.  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  2.  Diabetes mellitus.  3.  Hypertension.  4.  Hypothyroidism.    POSTOPERATIVE DIAGNOSES:  1.  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  2.  Diabetes mellitus.  3.  Hypertension.  4.  Hypothyroidism.    PROCEDURES PERFORMED:  1.  Minimally invasive right frontal craniotomy for resection of tumor.  2.  Stealth navigation.  3.  Microsurgical technique.    INDICATIONS IN DETAIL:  Ms. Jolene Velasquez is a very pleasant 79-year-old woman   with a known history of diabetes mellitus and hypertension who was seen by her   primary care physician for abnormal behavior.  The patient exhibited such odd   behaviors as leaving the door open and the lights on when she was sleeping.  She   had a workup, which included an MRI that showed a large tumor concerning for   high-grade malignancy in the right frontal lobe measuring 4.3 x 3.1 cm.  The   patient was seen and evaluated and admitted to [facility] for   definitive care.  The patient was seen by Dr. Villalobos and after the risks, benefits   and alternatives were described, she wished to proceed with resection of this   tumor.    PROCEDURE IN DETAIL:  The patient was brought to the Operating Room and a   general anesthetic was administered.  All proper lines were placed.  The patient   was placed in the supine position on a table and all pressure points padded.    The patient's head was flexed and turned towards the left to allow better access   to the right frontal region.  Her head was placed in 3-point fixation using a   Herron head dalton, which was attached to the operative table.  The Stealth   navigation was brought into the field and accurate registration was achieved   using the tracer function.   Once this was performed, the trajectory to the tumor   could clearly be seen.  The patient's hairline was drawn and a curvilinear line   of approximately 6 cm behind the hairline on the right side was traced.  The   patient's hair was clipped in this region using electric clippers.  The   patient's head was cleaned, prepped and draped in the usual fashion.  A   lidocaine-bupivacaine mix was infiltrated under the skin.  An incision was made   using a #10 blade and carried down to the calvarium using Bovie cautery.  The   skin flap was mobilized and held in retraction using cerebellar retractor.  A   single cory hole was made at the most posterior extent of the exposure and using   a high-speed LIBCAST drill with a craniotome attachment, a craniotomy flap of   approximately 3 cm was turned.  Once this was performed, the wound was irrigated   copiously.  All bleeding points were coagulated.  There was a dural rent and   the dura was opened using this rent.  Once this was performed, the wound was   irrigated copiously.  All bleeding points were coagulated and the microscope was   brought into the field.  Under microscopic visualization, we entered into a   sulcus in the right frontal lobe, just over the most anterior aspect of the   tumor.  We then continued with a small corticectomy posteriorly.  Once this was   performed, we were able to dissect down to the area of the most superficial   appearing tumor.  Once this was performed, a Vycor minimally invasive retractor   measuring 21 mm x 5 cm was placed.  Through this, we were able to see the   infiltrative tumor anteriorly and removed this using suction and bipolar   cautery.  Once this was performed, we then angled the tube back towards the   necrotic mass.  We began by first decompressing the mass using suction and   bipolar cautery, removing the center of the mass first.  Once this was   performed, we then found the edges of the mass first laterally and we were  able   to dissect around the tumor laterally and obtain a clean plane.  Once this was   complete, we then went anterior and continued to follow the tumor to the area of   the midline.  Once the clean boundary was found there, we then connected this   portion of the resection with the lateral portion.  Lastly, we turned our   attention to the medial portion of the tumor.  We went back to the center of the   tumor and began debulking more towards the center until we reached the area of   the corpus callosum.  We then resected all available tumor at the level of the   corpus callosum and posteriorly, we were able to see the lateral ventricle.  A   piece of Gelfoam was placed over this to keep CSF from escaping.  We then   resected more anteriorly and after this was performed, we had clean white matter   on all sides.  The wound was irrigated copiously.  All bleeding points were   coagulated.  The resection cavity was lined with Surgicel, which was glued in   place with Evicel.  Once this was complete, the Duragen was placed over the area   of the dural rent.  The bone flap was replaced using Mary Beth plates and screws.    The skin was then closed in layers with a 4-0 Monocryl in the skin.  A clean   dressing was placed.  The patient was taken out of 3-point fixation and awakened   by the Anesthesia staff.  At the point the patient was awake and following   commands, she was extubated.    EBL was approximately 100 mL.    There were no intraprocedural complications.    All counts were correct at the end of surgery.    Dr. Nathan Villalobos was present during the entire procedure.      LEW/IN  dd: 03/11/2019 19:39:24 (CDT)  td: 03/11/2019 19:59:55 (CDT)  Doc ID   #[id number]  Job ID #[id number]    CC:

## 2019-03-12 NOTE — ANESTHESIA POSTPROCEDURE EVALUATION
"Anesthesia Post Evaluation    Patient: Jolene Velasquez    Procedure(s) Performed: Procedure(s) (LRB):  CRANIOTOMY for tumor resection with stealth (Right)    Final Anesthesia Type: general  Patient location during evaluation: ICU  Patient participation: Yes- Able to Participate  Level of consciousness: awake  Post-procedure vital signs: reviewed and stable  Pain management: adequate  Airway patency: patent  PONV status at discharge: No PONV  Anesthetic complications: no      Cardiovascular status: blood pressure returned to baseline  Respiratory status: unassisted  Hydration status: euvolemic  Follow-up not needed.        Visit Vitals  BP (!) 124/58   Pulse 74   Temp 36.9 °C (98.5 °F) (Oral)   Resp 20   Ht 5' 2" (1.575 m)   Wt 83.3 kg (183 lb 10.3 oz)   SpO2 99%   Breastfeeding? No   BMI 33.59 kg/m²       Pain/Silvia Score: No Data Recorded      "

## 2019-03-12 NOTE — TRANSFER OF CARE
"Anesthesia Transfer of Care Note    Patient: Jolene Velasquez    Procedure(s) Performed: Procedure(s) (LRB):  CRANIOTOMY for tumor resection with stealth (Right)    Patient location: ICU    Anesthesia Type: general    Transport from OR: Transported from OR on 6-10 L/min O2 by face mask with adequate spontaneous ventilation    Post pain: adequate analgesia    Post assessment: no apparent anesthetic complications    Post vital signs: stable    Level of consciousness: lethargic    Nausea/Vomiting: no nausea/vomiting    Complications: none    Transfer of care protocol was followed      Last vitals:   Visit Vitals  BP (!) 123/58 (BP Location: Right arm, Patient Position: Lying)   Pulse 74   Temp 37.2 °C (98.9 °F) (Axillary)   Resp 14   Ht 5' 2" (1.575 m)   Wt 78.9 kg (174 lb)   SpO2 99%   Breastfeeding? No   BMI 31.83 kg/m²     "

## 2019-03-12 NOTE — H&P
[facility]  Neurocritical Care  History & Physical    Admit Date: 3/8/2019  Service Date: 03/11/2019  Length of Stay: 3    Subjective:     Chief Complaint: Brain mass    History of Present Illness: History obtained exclusively from chart as patient post-anesthesia and unable to provide history. Patient initially presented on 3/8 to [facility] for evaluation of brain mass. She had a one month history of confusion, HAs, and personality changes. 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 She was admitted to their service on the 8th and is now post-op from craniotomy for R frontal tumor resection. Patient will be admitted to [facility] for post-op monitoring.    PMH significant for DM, HTN and hypothyroidism.         Past Medical History:   Diagnosis Date    Diabetes mellitus     Hypertension     Thyroid disease      History reviewed. No pertinent surgical history.    No current facility-administered medications on file prior to encounter.      Current Outpatient Medications on File Prior to Encounter   Medication Sig Dispense Refill    ciprofloxacin HCl (CIPRO) 500 MG tablet Take 500 mg by mouth 2 (two) times daily.      levothyroxine (SYNTHROID) 75 MCG tablet Take 75 mcg by mouth once daily.      lisinopril-hydrochlorothiazide (PRINZIDE,ZESTORETIC) 10-12.5 mg per tablet Take 1 tablet by mouth once daily.      metoprolol succinate (TOPROL-XL) 25 MG 24 hr tablet Take 25 mg by mouth once daily.      rivaroxaban (XARELTO) 20 mg Tab Take 20 mg by mouth daily with dinner or evening meal.       Allergies: Patient has no known allergies.    Family History   Problem Relation Age of Onset    Diabetes Mother        Social History     Tobacco Use    Smoking status: Never Smoker    Smokeless tobacco: Never Used   Substance Use Topics    Alcohol use: Not on file    Drug use: Not on file      Review of Systems:Unable to obtain a complete ROS due to level of consciousness  "(patient still awakening from anesthesia).     Vitals:   Temp: 98.9 °F (37.2 °C)  Pulse: 73  Rhythm: normal sinus rhythm  BP: (!) 116/53  MAP (mmHg): 92  Resp: 18  SpO2: 99 %  O2 Device (Oxygen Therapy): room air    Temp  Min: 97.2 °F (36.2 °C)  Max: 98.9 °F (37.2 °C)  Pulse  Min: 58  Max: 78  BP  Min: 116/53  Max: 129/75  MAP (mmHg)  Min: 83  Max: 95  Resp  Min: 14  Max: 19  SpO2  Min: 90 %  Max: 99 %    No intake/output data recorded.   Unmeasured Output  Urine Occurrence: 1  Stool Occurrence: 0     Examination: Patient approximately  1 hour post-op, still awakening from anesthesia.   Constitutional: Well-nourished and -developed. No apparent distress.   Eyes: Slight scleral edema noted. Conjunctiva clear, anicteric. Lids no lesions.  Head/Ears/Nose/Mouth/Throat/Neck: Moist mucous membranes. External ears, nose atraumatic.   Cardiovascular: Regular rhythm. No murmurs. No leg edema.  Respiratory: Comfortable respirations. Clear to auscultation.  Gastrointestinal: No hernia. Soft, nondistended, nontender. + bowel sounds.    Neurologic: Patient approximately  1 hour post-op, still awakening from anesthesia.   -[id number]  -Drowsy but briskly awakens to voice, however needs frequent re-direction and re-awakening. Oriented to person. Only says name and "hey". No other verba l response to questions. Nods and shakes head appropriately.   -Cranial nerves intact, particularly EOMI, PERRL, face symmetric, tongue midline  -Strength: L sided drift present in upper and lower extremities. Easily opposes gravity and some resistance on R. Unable to fully test strength given poor attention and drowsiness   -Sensation intact to touch in arms, legs.      Today I independently reviewed pertinent medications, lines/drains/airways, imaging, laboratory results, notably:     Assessment/Plan:     Neuro   * Brain mass    POD0 s/p craniotomy for resection of R frontal mass  - Admit to [facility] for post-op monitoring  - NSGY continues to follow  - " SBP goal <140  - Dexamethasone 4 mg q6h  - Keppra seizure ppx  - HOB 30   - Pain control with PRN norco  - Post-op MRI with and without planned for tonight    - Post-op PT/OT/SLP ordered for tomorrow AM  - Unclear why patient on full AC at home, however will hold in immediate post-op setting.      Cardiac/Vascular   Hypertension    SBP goal 100-140  - Hold home ACEI/HCTZ combo in immediate post-op setting  - Continue home metoprolol  - PRN IV hydralazine      Endocrine   Type 2 diabetes mellitus    Hemoglobin A1c pending  - SSI      Hypothyroidism    Continue levothyroxine            The patient is being Prophylaxed for:  Venous Thromboembolism with: Mechanical  Stress Ulcer with: H2B  Ventilator Pneumonia with: not applicable    Activity Orders          None        Full Code    Dannielle Mckeon PA-C  Neurocritical Care  [facility]

## 2019-03-12 NOTE — HPI
History obtained exclusively from chart as patient post-anesthesia and unable to provide history. Patient initially presented on 3/8 to [facility] [place] of brain [place]. She had a one month history of confusion, HAs, and personality changes. MRI from OSH revealed brain tumor and she was transferred to [facility] for [place]GY evaluation. She was admitted to their service on the 8th and is now post-op from craniotomy for R frontal tumor resection. Patient will be admitted to [facility] for post-op monitoring.    PMH significant for DM, HTN and hypothyroidism.

## 2019-03-12 NOTE — PROGRESS NOTES
"[facility]  Neurosurgery  Progress Note    Subjective:     History of Present Illness: Ms. Jolene Velasquez is a 78 yo female with PMHx hypothyroidism, DM, HTN who presents to the ED for evaluation of a brain mass. She was recently seen by her PCP for abnormal behavior. Patient's family at bedside. Reports patient has been "acting weird" over the past month. Reports started with dizziness, patient's family thought she had a UTI, was prescribed cipro. Family states over the past month she has exhibited odd behavior such as leaving the front door open and lights on when she was sleeping, skipping showers, difficulty dressing herself. MRI at [facility] shows brain tumor. Sent to [facility] for neurosurgical evaluation. Patient alert and oriented on exam. Participates fully. Denies headache, visual changes, seizure activity, recent weight loss, numbness, paresthesias, gait difficulty, b/b incontinence. On Xarelto, patient took last night.         Post-Op Info:  Procedure(s) (LRB):  CRANIOTOMY for tumor resection with stealth (Right)   1 Day Post-Op     Interval History: TEDDY ON, AF VSS. Discontinued hoang, stable for transfer to the floor under neurosurgery service.  POD 1 s/p craniotomy for right frontal brain mass.     Medications:  Continuous Infusions:  Scheduled Meds:   bacitracin   Topical (Top) BID    dexamethasone  4 mg Oral Q6H    famotidine  20 mg Oral Daily    levETIRAcetam  500 mg Oral BID    levothyroxine  75 mcg Oral QHS    metoprolol succinate  25 mg Oral QHS    mupirocin  1 g Nasal BID    senna-docusate 8.6-50 mg  1 tablet Oral Daily     PRN Meds:acetaminophen, dextrose 50%, dextrose 50%, glucagon (human recombinant), glucose, glucose, glucose, hydrALAZINE, HYDROcodone-acetaminophen, insulin aspart U-100, ondansetron, sodium chloride 0.9%     Review of Systems  Objective:     Weight: 83.3 kg (183 lb 10.3 oz)  Body mass index is 33.59 kg/m².  Vital Signs (Most " Recent):  Temp: 98.4 °F (36.9 °C) (03/12/19 1100)  Pulse: 75 (03/12/19 1300)  Resp: 12 (03/12/19 1300)  BP: (!) 109/49 (03/12/19 1300)  SpO2: 98 % (03/12/19 1300) Vital Signs (24h Range):  Temp:  [97.7 °F (36.5 °C)-98.9 °F (37.2 °C)] 98.4 °F (36.9 °C)  Pulse:  [58-89] 75  Resp:  [12-32] 12  SpO2:  [93 %-100 %] 98 %  BP: (103-162)/(49-70) 109/49     Date 03/12/19 0700 - 03/13/19 0659   Shift 6388-4992 4824-8335 0637-8253 24 Hour Total   INTAKE   P.O. 650   650   I.V.(mL/kg) 400(4.8)   400(4.8)   Shift Total(mL/kg) 1050(12.6)   1050(12.6)   OUTPUT   Urine(mL/kg/hr) 300   300   Shift Total(mL/kg) 300(3.6)   300(3.6)   Weight (kg) 83.3 83.3 83.3 83.3                        Neurosurgery Physical Exam    PERRL, EOMI   no pronator drift   Alert and oriented  JONES AG  SILT  Reflexes normoactive    Significant Labs:  Recent Labs   Lab 03/11/19  0309 03/12/19  0110   * 197*  197*    137  137   K 4.1 4.9  4.9    106  106   CO2 21* 22*  22*   BUN 41* 30*  30*   CREATININE 1.5* 1.2  1.2   CALCIUM 10.0 9.3  9.3   MG  --  1.4*     Recent Labs   Lab 03/11/19  0309 03/12/19  0110   WBC 9.07 10.97   HGB 11.1* 11.8*   HCT 34.9* 35.5*    254     Recent Labs   Lab 03/11/19  0310   INR 1.0   APTT 22.0     Microbiology Results (last 7 days)     ** No results found for the last 168 hours. **        Recent Lab Results       03/12/19  1107   03/12/19  0738   03/12/19  0110   03/11/19 2026   03/11/19  1353        Immature Granulocytes     0.4         Immature Grans (Abs)     0.04  Comment:  Mild elevation in immature granulocytes is non specific and   can be seen in a variety of conditions including stress response,   acute inflammation, trauma and pregnancy. Correlation with other   laboratory and clinical findings is essential.           Albumin     3.1         Alkaline Phosphatase     43         ALT     59         Anion Gap     9              9         AST     43         Baso #     0.01         Basophil%      0.1         Total Bilirubin     0.3  Comment:  For infants and newborns, interpretation of results should be based  on gestational age, weight and in agreement with clinical  observations.  Premature Infant recommended reference ranges:  Up to 24 hours.............<8.0 mg/dL  Up to 48 hours............<12.0 mg/dL  3-5 days..................<15.0 mg/dL  6-29 days.................<15.0 mg/dL           BUN, Bld     30              30         Calcium     9.3              9.3         Chloride     106              106         CO2     22              22         Creatinine     1.2              1.2         Differential Method     Automated         eGFR if      50.4              50.4         eGFR if non      43.7  Comment:  Calculation used to obtain the estimated glomerular filtration  rate (eGFR) is the CKD-EPI equation.                 43.7  Comment:  Calculation used to obtain the estimated glomerular filtration  rate (eGFR) is the CKD-EPI equation.            Eos #     0.0         Eosinophil%     0.0         Estimated Avg Glucose       131       Glucose     197              197         Gran # (ANC)     9.5         Gran%     86.1         Hematocrit     35.5         Hemoglobin     11.8         Hemoglobin A1C External       6.2  Comment:  ADA Screening Guidelines:  5.7-6.4%  Consistent with prediabetes  >or=6.5%  Consistent with diabetes  High levels of fetal hemoglobin interfere with the HbA1C  assay. Heterozygous hemoglobin variants (HbS, HgC, etc)do  not significantly interfere with this assay.   However, presence of multiple variants may affect accuracy.         Lymph #     1.2         Lymph%     10.8         Magnesium     1.4         MCH     31.5         MCHC     33.2         MCV     95         Mono #     0.3         Mono%     2.6         MPV     10.2         nRBC     0         Phosphorus     3.3         Platelets     254         POCT Glucose 255 195     125     Potassium     4.9               4.9         Total Protein     7.2         RBC     3.75         RDW     12.2         Sodium     137              137         WBC     10.97                              Significant Diagnostics:  CT: No results found in the last 24 hours.  MRI: Mri Brain W Wo Contrast    Result Date: 3/12/2019  Status post right frontal lobe tumor resection, as above.  There is some enhancement surrounding the resection cavity, so residual tumor could not be excluded.  Follow-up scans including perfusion imaging will be useful to differentiate residual/recurrent tumor and postoperative changes. Electronically signed by: Camacho Mera MD Date:    03/12/2019 Time:    08:07    Assessment/Plan:     * Brain mass    79 year old female with a PMHx hypothyroidism, DM, HTN who presents after odd behavior noted by family over the past month, found to have large 4x3cm intraxial lesion with edema, effacement of R lateral vent on MRI.  Now s/p R sided craniotomy for tumor resection (3/11).     -Patient neurologically stable on exam, POD 1  -Continue Keppra for seizure prevention  -Continue Decadron 4 mg q 6 hours for cerebral edema.   -Famotidine for PPI while on steroids.   -ADAT  -PT/OT/OOB as tolerated  -Stable for step down to the floor to neurosurgery  -Call with any changes in exam    Ppx: ESEQUIEL Haile, Trip Camarena MD  Neurosurgery  [facility]

## 2019-03-12 NOTE — ASSESSMENT & PLAN NOTE
POD0 s/p craniotomy for resection of R frontal mass  - Admit to [facility] for post-op monitoring  - NSGY continues to follow  - SBP goal <140  - Dexamethasone 4 mg q6h  - Keppra seizure ppx  - HOB 30   - Pain control with PRN norco  - Post-op MRI with and without planned for tonight    - Post-op PT/OT/SLP ordered for tomorrow AM  - Unclear why patient on full AC at home, however will hold in immediate post-op setting.

## 2019-03-13 NOTE — PT/OT/SLP EVAL
"Speech Language Pathology Evaluation  Cognitive Communication    Patient Name:  Jolene Velasquez   MRN:  [id number]  Admitting Diagnosis: Brain mass    Recommendations:     Recommendations:                General Recommendations:  Follow-up not indicated  Aspiration Precautions: Standard aspiration precautions   General Precautions: Standard, fall  Communication strategies:  none    History:     Past Medical History:   Diagnosis Date    Diabetes mellitus     Hypertension     Thyroid disease        Past Surgical History:   Procedure Laterality Date    CRANIOTOMY for tumor resection with stealth Right 3/11/2019    Performed by Nathan Villalobos MD at [facility] OR [address] [place] [address]       Social History: Patient lives by herself in [place]. She has 3 children who live close by and a granddaughter who visits "almost every day."    Subjective     Patient awake and alert during session  "I have blank spots in my memory from the past few weeks, but I feel better now."     Pain/Comfort:  · Pain Rating 1: 0/10  · Pain Rating Post-Intervention 1: 0/10    Objective:   Cognitive Status:    Attention No obvious deficits observed throughout session  Orientation Oriented x4  Memory Immediate Recall WFL, Delayed Recall: independently recalled 0/4 unrelated objects with 5-minute interval, increasing to 4/4 with min assist and recall general information WFL  Problem Solving Categories 1/1 with mod assist and Sequencing 75% accurate with min assist  Simple calculation 3/3 simple time/money management with min assist  Reasoning Inferences: WFL      Receptive Language:   Comprehension:      Questions Complex yes/no WFL  Open ended questions WFL  Commands  complex/abstract commands WFL    Pragmatics:    WFL    Expressive Language:  Verbal:    Repetition Sentences WFL  Naming Confrontation WFL, Convergent WFL and Divergent responsive WFL      Motor Speech:  WFL    Voice:   WFL    Visual-Spatial:  WFL    Reading:   [facility]     Written Expression: "   WFL    Treatment: Patient seen for speech/language/cognitive eval. She was sitting up in bed during session with niece at bedside. Patient and niece reported that patient appeared to be close to baseline. SLP educated patient and family regarding role of ST in cognitive therapy following discharge. Patient may be appropriate for high-level cognitive therapy following discharge at her discretion. No further questions from patient or family.    Assessment:   Jolene Velasquez is a 79 y.o. female with an SLP diagnosis of mild cognitive-linguistic impairment.     Goals:   Multidisciplinary Problems     SLP Goals        Problem: SLP Goal    Goal Priority Disciplines Outcome   SLP Goal     SLP                    Plan:   · Plan of Care reviewed with:  patient(esdras)   · SLP Follow-Up:  No       Discharge recommendations:  Discharge Facility/Level of Care Needs: (HH or OP ST)   Barriers to Discharge:  None    Time Tracking:   SLP Treatment Date:   03/13/19  Speech Start Time:  1007  Speech Stop Time:  1033     Speech Total Time (min):  26 min    Billable Minutes: Eval 11  and Self Care/Home Management Training 15    SAURAV Silva-SLP  Speech-Language Pathology  Pager: [phone]   03/13/2019

## 2019-03-13 NOTE — PT/OT/SLP EVAL
"Physical Therapy Evaluation    Patient Name:  Jolene Velasquez   MRN:  [id number]    Recommendations:     Discharge Recommendations:  (HHPT)   Discharge Equipment Recommendations: walker, rolling   Barriers to discharge: Inaccessible home 1 SOSA    Assessment:     Jolene Velasquez is a 79 y.o. female admitted with a medical diagnosis of Brain mass.  She presents with the following impairments/functional limitations:  impaired functional mobilty, gait instability, impaired balance . Pt presents with L sided lean with gait causing instability at times, improved with RW.    Rehab Prognosis: Good; patient would benefit from acute skilled PT services to address these deficits and reach maximum level of function.    Recent Surgery: Procedure(s) (LRB):  CRANIOTOMY for tumor resection with stealth (Right) 2 Days Post-Op    Plan:     During this hospitalization, patient to be seen 3 x/week to address the identified rehab impairments via gait training, therapeutic activities, therapeutic exercises and progress toward the following goals:    · Plan of Care Expires:  04/12/19    Subjective   "I hold onto the wall at my house and use the rollator when I go out"    Pain/Comfort:  · Pain Rating 1: 0/10  · Pain Rating Post-Intervention 1: 0/10    Patients cultural, spiritual, Jew conflicts given the current situation: no    Living Environment:  Pt lives alone in a 1 story home with 1 SOSA; pt's niece will be staying with her upon D/C  Prior to admission, patients level of function was independent, used rollator when out of house.  Equipment used at home: none.  Upon discharge, patient will have assistance from niece.    Objective:     Communicated with nurse prior to session.  Patient found call button in reach and niece present (no lines)  upon PT entry to room.    General Precautions: Standard, fall   Orthopedic Precautions:N/A   Braces: N/A     Exams:  · Cognitive Exam:  Patient is oriented to Person, Place and " Situation  · Sensation:    · RLE ROM: WFL  · RLE Strength: WFL  · LLE ROM: WFL  · LLE Strength: WFL    Functional Mobility:  · Bed Mobility:     · Supine to Sit: modified independence  · Transfers:     · Sit to Stand:  stand by assistance with rolling walker  · Gait: 150ft with no AD for ~ 10ft with pt holding onto the wall and door for support then with RW with CGA with L sided lean noted during gait causing instability at times.     Therapeutic Activities and Exercises:   Pt sat on the EOB ~ 5 min with SBA prior to transfer    AM-PAC 6 CLICK MOBILITY  Total Score:20     Patient left up in chair with call button in reach, nurse notified and niece present.    GOALS:   Multidisciplinary Problems     Physical Therapy Goals        Problem: Physical Therapy Goal    Goal Priority Disciplines Outcome Goal Variances Interventions   Physical Therapy Goal     PT, PT/OT Ongoing (interventions implemented as appropriate)     Description:  PT goals until 3/18/19    1. Pt sit to stand with RW with mod independent-not met  2. Pt to perform gait 200ft with RW with mod independent.-not met  3. Pt to go up/down curb step with RW with SBA.-not met                      History:     Past Medical History:   Diagnosis Date    Diabetes mellitus     Hypertension     Thyroid disease        History reviewed. No pertinent surgical history.    Time Tracking:     PT Received On: 03/13/19  PT Start Time: 1028     PT Stop Time: 1048  PT Total Time (min): 20 min     Billable Minutes: Evaluation 20      Fabiana Munoz, PT  03/13/2019

## 2019-03-13 NOTE — DISCHARGE SUMMARY
"[facility]  Neurosurgery  Discharge Summary      Patient Name: Jolene Velasquez  MRN: [id number]  Admission Date: 3/8/2019  Hospital Length of Stay: 5 days  Discharge Date and Time:  03/13/2019   Attending Physician: Nathan Villalobos MD   Discharging Provider: ILEANA Fraser  Primary Care Provider: Cortez Dave MD    HPI:   Ms. Jolene Velasquez is a 78 yo female with PMHx hypothyroidism, DM, HTN who presents to the ED for evaluation of a brain mass. She was recently seen by her PCP for abnormal behavior. Patient's family at bedside. Reports patient has been "acting weird" over the past month. Reports started with dizziness, patient's family thought she had a UTI, was prescribed cipro. Family states over the past month she has exhibited odd behavior such as leaving the front door open and lights on when she was sleeping, skipping showers, difficulty dressing herself. MRI at [facility] shows brain tumor. [facility] [place] [facility] [place]. Patient alert and oriented on exam. Participates fully. Denies headache, visual changes, seizure activity, recent weight loss, numbness, paresthesias, gait difficulty, b/b incontinence. On Xarelto, patient took last night.           DATE OF PROCEDURE:  03/11/2019     SURGEON:  Nathan Villalobos M.D., Ph.D.     ASSISTANT:  Gordon Rodriguez M.D. (RES) (the assistant is a [facility] resident).     PREOPERATIVE DIAGNOSES:  1.  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  2.  Diabetes mellitus.  3.  Hypertension.  4.  Hypothyroidism.     POSTOPERATIVE DIAGNOSES:  1.  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  2.  Diabetes mellitus.  3.  Hypertension.  4.  Hypothyroidism.     PROCEDURES PERFORMED:  1.  Minimally invasive right frontal craniotomy for resection of tumor.  2.  Stealth navigation.  3.  Microsurgical technique.     INDICATIONS IN DETAIL:  Ms. Jolene Velasquez is a very pleasant 79-year-old woman " Phone call to C[place]. Message left regarding normal 3 hour GTT.   with a known history of diabetes mellitus and hypertension who was seen by her primary care physician for abnormal behavior.  The patient exhibited such odd behaviors as leaving the door open and the lights on when she was sleeping.  She  had a workup, which included an MRI that showed a large tumor concerning for high-grade malignancy in the right frontal lobe measuring 4.3 x 3.1 cm.  The patient was seen and evaluated and admitted to [facility] for definitive care.  The patient was seen by Dr. Villalobos and after the risks, benefits and alternatives were described, she wished to proceed with resection of this tumor.        Hospital Course:  3/8: Admit to NSGY service. CT c/a/p ordered for metastatic work up. Started on Keppra. Xarelto held.   3/9: NAEON. Wants to know update on surgical plan. [facility] medicine assisting with w/up, CT CAP today.   3/10: consent for surgery tomorrow. MRI stealth complete, remains stable on the floor.   3/11: OR for craniotomy for tumor resection. No intra op complications. Tolerated procedure well. Recovered in neuro ICU.   3/12: -161, AF.  Stable for step down to nsgy. Continuing Dex and keppra.   3/13: NAEON. Pain controlled. Tolerating diet. DC home with . VASU with Dr. Villalobos in 2 weeks. Discharge instructions given verbally to patient and family. All of their questions were answered. They were encouraged to call the clinic with any questions or concerns prior to follow up appt.         Consults: neuro critical care  Consults (From admission, onward)        Status Ordering Provider     Inpatient consult to [facility] Medicine-General  Once     Provider:  (Not yet assigned)    Completed BRYANT MIRELES     Inpatient consult to Neurosurgery  Once     Provider:  (Not yet assigned)    Acknowledged DYANA MIRANDA          Significant Diagnostic Studies: Labs:   BMP:   Recent Labs   Lab 03/12/19  0110 03/13/19  0449   *  197* 193*     137 138   K 4.9   "4.9 4.8     106 104   CO2 22*  22* 26   BUN 30*  30* 31*   CREATININE 1.2  1.2 1.3   CALCIUM 9.3  9.3 10.0   MG 1.4* 1.7    and CBC   Recent Labs   Lab 03/12/19  0110 03/13/19  0449   WBC 10.97 15.79*   HGB 11.8* 10.6*   HCT 35.5* 33.6*    284     Radiology: MRI: brain with/without contrast; MRI brain synaptive  CT scan: CT ABDOMEN PELVIS WITH CONTRAST:  Cardiac Graphics: ECG:     Pending Diagnostic Studies:     Procedure Component Value Units Date/Time    Urinalysis, Reflex to Urine Culture Urine, Clean Catch [[id number]] Collected:  03/13/19 1311    Order Status:  Sent Lab Status:  In process Updated:  03/13/19 1312    Specimen:  Urine         Final Active Diagnoses:    Diagnosis Date Noted POA    PRINCIPAL PROBLEM:  Brain mass [G93.9] 03/08/2019 Yes    Type 2 diabetes mellitus [E11.9] 03/11/2019 Yes    Preop examination [Z01.818] 03/09/2019 Not Applicable    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 Villalobos    Patient Instructions:   See the patient instruction tab for detailed discharge instructions and follow up information.        WALKER FOR HOME USE     Order Specific Question Answer Comments   Type of Walker: Adult (5'4"-6'6")    With wheels? Yes    Height: 5' 2" (1.575 m)    Weight: 83.3 kg (183 lb 10.3 oz)    Length of need (1-99 months): 99    Does patient have medical equipment at home? none    Please check all that apply: Patient's condition impairs ambulation.    Please check all that apply: Patient needs help to get in and out of chair.    Please check all that apply: Walker will be used for gait training.    Please check all that apply: Altered sensory perception.    Please check all that apply: Patient is unable to safely " "ambulate without equipment.    Vendor: [facility]    Expected Date of Delivery: 3/13/2019      COMMODE FOR HOME USE     Order Specific Question Answer Comments   Type: Standard    Height: 5' 2" (1.575 m)    Weight: 83.3 kg (183 lb 10.3 oz)    Does patient have medical equipment at home? none    Length of need (1-99 months): 99    Vendor: [facility]    Expected Date of Delivery: 3/13/2019      Ambulatory referral to Home Health   Referral Priority: Routine Referral Type: Home Health   Referral Reason: Specialty Services Required   Requested Specialty: Home Health Services   Number of Visits Requested: 1     Notify your health care provider if you experience any of the following:  temperature >100.4     Notify your health care provider if you experience any of the following:  persistent nausea and vomiting or diarrhea     Notify your health care provider if you experience any of the following:  severe uncontrolled pain     Notify your health care provider if you experience any of the following:  redness, tenderness, or signs of infection (pain, swelling, redness, odor or green/yellow discharge around incision site)     Notify your health care provider if you experience any of the following:  difficulty breathing or increased cough     Notify your health care provider if you experience any of the following:  severe persistent headache     Notify your health care provider if you experience any of the following:  worsening rash     Notify your health care provider if you experience any of the following:  persistent dizziness, light-headedness, or visual disturbances     Notify your health care provider if you experience any of the following:  increased confusion or weakness     No dressing needed     Activity as tolerated     Medications:  Reconciled Home Medications:      Medication List      START taking these medications    dexamethasone 2 MG tablet  Commonly known as:  DECADRON  Take 4mg q 8h for 3 days, then 4mg q " 12h for 3 days, then 2mg q 8h for 3 days, then 2mg q 12h and continue this dose until seen by Dr. Villalobos.     famotidine 20 MG tablet  Commonly known as:  PEPCID  Take 1 tablet (20 mg total) by mouth once daily.  Start taking on:  3/14/2019     HYDROcodone-acetaminophen 5-325 mg per tablet  Commonly known as:  NORCO  Take 1 tablet by mouth every 4 to 6 hours as needed for Pain.     levETIRAcetam 500 MG Tab  Commonly known as:  KEPPRA  Take 1 tablet (500 mg total) by mouth 2 (two) times daily.        CHANGE how you take these medications    metoprolol succinate 25 MG 24 hr tablet  Commonly known as:  TOPROL-XL  Take 1 tablet (25 mg total) by mouth every evening.  What changed:  when to take this        CONTINUE taking these medications    levothyroxine 75 MCG tablet  Commonly known as:  SYNTHROID  Take 75 mcg by mouth once daily.     lisinopril-hydrochlorothiazide 10-12.5 mg per tablet  Commonly known as:  PRINZIDE,ZESTORETIC  Take 1 tablet by mouth once daily.     metFORMIN 1000 MG tablet  Commonly known as:  GLUCOPHAGE  Take 1,000 mg by mouth 2 (two) times daily with meals.        STOP taking these medications    ciprofloxacin HCl 500 MG tablet  Commonly known as:  CIPRO     XARELTO 20 mg Tab  Generic drug:  rivaroxaban            ILEANA Fraser  Neurosurgery  [facility]

## 2019-03-13 NOTE — PROGRESS NOTES
"[facility]  Neurosurgery  Progress Note    Subjective:     History of Present Illness: Ms. Jolene Velasquez is a 80 yo female with PMHx hypothyroidism, DM, HTN who presents to the ED for evaluation of a brain mass. She was recently seen by her PCP for abnormal behavior. Patient's family at bedside. Reports patient has been "acting weird" over the past month. Reports started with dizziness, patient's family thought she had a UTI, was prescribed cipro. Family states over the past month she has exhibited odd behavior such as leaving the front door open and lights on when she was sleeping, skipping showers, difficulty dressing herself. MRI at [facility] shows brain tumor. Sent to [facility] for neurosurgical evaluation. Patient alert and oriented on exam. Participates fully. Denies headache, visual changes, seizure activity, recent weight loss, numbness, paresthesias, gait difficulty, b/b incontinence. On Xarelto, patient took last night.         Post-Op Info:  Procedure(s) (LRB):  CRANIOTOMY for tumor resection with stealth (Right)   2 Days Post-Op     Interval History:   NAEON. Patient resting comfortably in the chair. Family member at bedside. Reports pain controlled. Denies vision changes, weakness, seizure like activity, incontinence, or any new symptoms. Tolerating diet. Voiding appropriately.     Medications:  Continuous Infusions:  Scheduled Meds:   bacitracin   Topical (Top) BID    dexamethasone  4 mg Oral Q6H    famotidine  20 mg Oral Daily    heparin (porcine)  5,000 Units Subcutaneous Q8H    insulin aspart U-100  3 Units Subcutaneous TIDWM    levETIRAcetam  500 mg Oral BID    levothyroxine  75 mcg Oral QHS    metoprolol succinate  25 mg Oral QHS    mupirocin  1 g Nasal BID    polyethylene glycol  17 g Oral Daily    senna-docusate 8.6-50 mg  1 tablet Oral BID     PRN Meds:acetaminophen, dextrose 50%, dextrose 50%, glucagon (human recombinant), glucose, glucose, " glucose, hydrALAZINE, HYDROcodone-acetaminophen, insulin aspart U-100, ondansetron, sodium chloride 0.9%     Review of Systems  Objective:     Weight: 83.3 kg (183 lb 10.3 oz)  Body mass index is 33.59 kg/m².  Vital Signs (Most Recent):  Temp: 97.4 °F (36.3 °C) (03/13/19 1253)  Pulse: 69 (03/13/19 1506)  Resp: 18 (03/13/19 1253)  BP: (!) 142/83 (03/13/19 1253)  SpO2: 99 % (03/13/19 1253) Vital Signs (24h Range):  Temp:  [97.4 °F (36.3 °C)-98.6 °F (37 °C)] 97.4 °F (36.3 °C)  Pulse:  [63-94] 69  Resp:  [16-44] 18  SpO2:  [95 %-99 %] 99 %  BP: (103-147)/(54-89) 142/83     Date 03/13/19 0700 - 03/14/19 0659   Shift 5498-8774 7290-9947 0589-3373 24 Hour Total   INTAKE   Shift Total(mL/kg)       OUTPUT   Urine(mL/kg/hr) 1100(1.7)   1100   Shift Total(mL/kg) 1100(13.2)   1100(13.2)   Weight (kg) 83.3 83.3 83.3 83.3       Neurosurgery Physical Exam   General: well developed, well nourished, no distress.   Head: normocephalic  Neurologic: Alert and oriented. Thought content appropriate.  GCS: Motor: 6/Verbal: 5/Eyes: 4 GCS Total: 15  Mental Status: Awake, Alert, Oriented x 4  Language: No aphasia  Speech: No dysarthria  Cranial nerves: face symmetric, tongue midline, CN II-XII grossly intact.   Eyes: pupils equal, round, reactive to light with accomodation, EOMI.   Pulmonary: normal respirations, no signs of respiratory distress  Abdomen: soft, non-distended, not tender to palpation  Skin: Skin is warm, dry and intact.  Sensory: intact to light touch throughout  Motor Strength:Moves all extremities spontaneously with good tone.  Full strength upper and lower extremities. No abnormal movements seen.   Babinski's: Negative.  Clonus: Negative.  Finger-to-nose normal.   Pronator drift: intact bilaterally      Incision:  Clean, dry, sutures intact. Skin edges well approximated. No surrounding erythema or edema. No drainage or TTP.       Significant Labs:  Recent Labs   Lab 03/12/19  0110 03/13/19  0449   *  197* 193*   NA  137  137 138   K 4.9  4.9 4.8     106 104   CO2 22*  22* 26   BUN 30*  30* 31*   CREATININE 1.2  1.2 1.3   CALCIUM 9.3  9.3 10.0   MG 1.4* 1.7     Recent Labs   Lab 03/12/19  0110 03/13/19  0449   WBC 10.97 15.79*   HGB 11.8* 10.6*   HCT 35.5* 33.6*    284         Assessment/Plan:     * Brain mass    79 year old female with a PMHx hypothyroidism, DM, and HTN, who presents to the ED for evaluation of a brain mass. Now s/p crani for tumor resection on 3/11.    -Patient neurologically stable on exam  -Continue Keppra for seizure prevention  -Begin Decadron taper to 2 mg BID cerebral edema. Continue Famotidine for PPI.   -Leukocytosis: Likely 2/2 steroids and recent procedure. Patient afebrile. Will check UA.   -DM: DC on Metformin  -HTN: Continue home medications  -OK to restart your Xarelto on 3/25/19  -FU with Dr. Villalobos in 2 weeks for wound check and path results  -Discharge instructions given verbally to patient and family. All of their questions were answered. They were encouraged to call the clinic with any questions or concerns prior to follow up appt.              Discussed with Dr. Villalobos  Please call with any questions      Lindsay Pelletier PA-C   Neurosurgery   Pager: [phone]

## 2019-03-13 NOTE — SUBJECTIVE & OBJECTIVE
Interval History:   NAEON. Patient resting comfortably in the chair. Family member at bedside. Reports pain controlled. Denies vision changes, weakness, seizure like activity, incontinence, or any new symptoms. Tolerating diet. Voiding appropriately.     Medications:  Continuous Infusions:  Scheduled Meds:   bacitracin   Topical (Top) BID    dexamethasone  4 mg Oral Q6H    famotidine  20 mg Oral Daily    heparin (porcine)  5,000 Units Subcutaneous Q8H    insulin aspart U-100  3 Units Subcutaneous TIDWM    levETIRAcetam  500 mg Oral BID    levothyroxine  75 mcg Oral QHS    metoprolol succinate  25 mg Oral QHS    mupirocin  1 g Nasal BID    polyethylene glycol  17 g Oral Daily    senna-docusate 8.6-50 mg  1 tablet Oral BID     PRN Meds:acetaminophen, dextrose 50%, dextrose 50%, glucagon (human recombinant), glucose, glucose, glucose, hydrALAZINE, HYDROcodone-acetaminophen, insulin aspart U-100, ondansetron, sodium chloride 0.9%     Review of Systems  Objective:     Weight: 83.3 kg (183 lb 10.3 oz)  Body mass index is 33.59 kg/m².  Vital Signs (Most Recent):  Temp: 97.4 °F (36.3 °C) (03/13/19 1253)  Pulse: 69 (03/13/19 1506)  Resp: 18 (03/13/19 1253)  BP: (!) 142/83 (03/13/19 1253)  SpO2: 99 % (03/13/19 1253) Vital Signs (24h Range):  Temp:  [97.4 °F (36.3 °C)-98.6 °F (37 °C)] 97.4 °F (36.3 °C)  Pulse:  [63-94] 69  Resp:  [16-44] 18  SpO2:  [95 %-99 %] 99 %  BP: (103-147)/(54-89) 142/83     Date 03/13/19 0700 - 03/14/19 0659   Shift 5137-6719 7929-6554 5617-5628 24 Hour Total   INTAKE   Shift Total(mL/kg)       OUTPUT   Urine(mL/kg/hr) 1100(1.7)   1100   Shift Total(mL/kg) 1100(13.2)   1100(13.2)   Weight (kg) 83.3 83.3 83.3 83.3       Neurosurgery Physical Exam   General: well developed, well nourished, no distress.   Head: normocephalic  Neurologic: Alert and oriented. Thought content appropriate.  GCS: Motor: 6/Verbal: 5/Eyes: 4 GCS Total: 15  Mental Status: Awake, Alert, Oriented x 4  Language: No  aphasia  Speech: No dysarthria  Cranial nerves: face symmetric, tongue midline, CN II-XII grossly intact.   Eyes: pupils equal, round, reactive to light with accomodation, EOMI.   Pulmonary: normal respirations, no signs of respiratory distress  Abdomen: soft, non-distended, not tender to palpation  Skin: Skin is warm, dry and intact.  Sensory: intact to light touch throughout  Motor Strength:Moves all extremities spontaneously with good tone.  Full strength upper and lower extremities. No abnormal movements seen.   Babinski's: Negative.  Clonus: Negative.  Finger-to-nose normal.   Pronator drift: intact bilaterally      Incision:  Clean, dry, sutures intact. Skin edges well approximated. No surrounding erythema or edema. No drainage or TTP.       Significant Labs:  Recent Labs   Lab 03/12/19 0110 03/13/19 0449   *  197* 193*     137 138   K 4.9  4.9 4.8     106 104   CO2 22*  22* 26   BUN 30*  30* 31*   CREATININE 1.2  1.2 1.3   CALCIUM 9.3  9.3 10.0   MG 1.4* 1.7     Recent Labs   Lab 03/12/19 0110 03/13/19 0449   WBC 10.97 15.79*   HGB 11.8* 10.6*   HCT 35.5* 33.6*    284

## 2019-03-13 NOTE — DISCHARGE INSTRUCTIONS
Patient Information  -No driving until released by Dr. Villalobos  -Do not take any OTC products containing acetaminophen at the same time as you take your narcotic pain medication. Medications that may contain acetaminophen include but are not limited to: Excedrin and other headache medications, arthritis medications, cold and sinus medications, etc. Please review the list of active ingredients in any OTC medication prior to taking it.  -OK to restart your Xarelto on 3/25/19  -Do not take any Aspirin or Aspirin containing products for 2 weeks after surgery  -Do not take any Aleeve, Naprosyn, Naproxen, Ibuprofen, Advil or any other NSAID for 2 weeks after surgery  -Do not consume any alcoholic beverages until released by your Neurosurgeon  -Do not perform any excessive bending over or leaning forward as this is a fall hazard.  -Do not perform any heavy lifting or lifting more than 10 lbs from the ground level as this is a fall hazard.    Contact the Neurosurgery Office immediately if:  -If you begin to notice any neurologic changes such as:           -Sudden onset of lethargy or sleepiness           -Sudden confusion, trouble speaking, or understanding            -Sudden trouble seeing in one or both eyes            -Sudden trouble walking, dizziness, loss of coordination            -Sudden severe headache with no known cause            -Sudden onset of numbness or weakness     Wound Care:  Keep your incision open to air. You may shower on the 2nd day after your surgery. Keep the incision clean and dry at all times. Please cover the incision while showering and REMOVE once you have completed taking your shower. Do not allow the force of water to hit the incision. If the incision gets damp, pat it dry. Do not rub or scrub the incision. You cannot take a bath/swim/submerge the incision until 8 weeks after surgery.    Apply Bacitracin ointment (over the counter) to incision twice daily.    Call your doctor or go to the  Emergency Room for any signs of infection including: increased redness, drainage, pain or fever (temperature greater than or equal to 101.4).       Miscellaneous:  -You were started on a steroid (Decadron) taper while in the hospital. Please continue to take this medication as instructed.   -Please continue to take Famotidine to protect your stomach while on a steroid. Once you have completed the steroid course, you can stop taking the Famotidine.   -You were started on a medication to prevent seizures (Keppra) while in the hospital. Please continue to take this medication as instructed.   -Follow up with Dr. Villalobos in 2 weeks for a wound check and pathology results. Appointment will be mailed to you.      Neurosurgery Office: [phone]

## 2019-03-13 NOTE — PLAN OF CARE
SW following for DC needs. SW in communication with CM.    Patient has no preference for HH. SW sent referral to [facility] via [facility].      03/13/19 1427   Post-Acute Status   Post-Acute Authorization Home Health/Hospice   Home Health/Hospice Status Referrals Sent     Madina Coughlin LMSW  [facility]  [id number]

## 2019-03-13 NOTE — PROGRESS NOTES
Discharge instructions/prescriptions reviewed with pt and her sister per Duy SELF RN.  Clarified with  rolling walker and bedside commode to be delivered to pt's room prior to leaving hospital.  All questions answered, will remove PIV x2 and request transport once equipment has arrived to room. WCTM.     UA sent off prior to DC.

## 2019-03-13 NOTE — PLAN OF CARE
Patient accepted by [facility].      03/13/19 1449   Post-Acute Status   Post-Acute Authorization Home Health/Hospice   Home Health/Hospice Status Authorization Obtained     Madina Coughlin LMSW  [facility]  [id number]

## 2019-03-13 NOTE — PLAN OF CARE
Problem: SLP Goal  Goal: SLP Goal  Patient seen for speech/language/cognitive eval. Patient not appropriate for ST in the acute setting, but may wish to pursue home health or outpatient ST.    SAURAV Silva-SLP  Speech-Language Pathology  Pager: [phone]

## 2019-03-13 NOTE — PROGRESS NOTES
Nursing Transfer Note       Transfer 1002/9095    Transfer via bed    Transfer with med/chart    Transported by RN    Medicines sent: yes    Chart sent with patient: yes    Notified: family at bedside    Bedside Neuro assessment performed: yes    Bedside Handoff given to: edmundo

## 2019-03-13 NOTE — PLAN OF CARE
Problem: Physical Therapy Goal  Goal: Physical Therapy Goal  PT goals until 3/18/19    1. Pt sit to stand with RW with mod independent-not met  2. Pt to perform gait 200ft with RW with mod independent.-not met  3. Pt to go up/down curb step with RW with SBA.-not met    Outcome: Ongoing (interventions implemented as appropriate)  Pt's goals set and pt will benefit from skilled PT services to work towards improved functional mobility including: transfers, up/down step, and gait.   Fabiana Munoz, PT  3/13/2019

## 2019-03-13 NOTE — PLAN OF CARE
JOANNE spoke to Gisselle at [facility]. Per Gisselle, the patient's DME (Com[employer] and Walker) will be delivered to the room shortly.     JOANNE notified Duy, charge nurse, of the above.      03/13/19 1432   Post-Acute Status   Post-Acute Authorization E   E Status Pending Delivery [facility]     Madina Coughlin, LMSW [facility]  [id number]

## 2019-03-13 NOTE — PLAN OF CARE
Patient to be discharged home.  The patient will have home health with [facility] upon discharge.   set up per .  Family to provide transportation home.  Neurosurgery clinic to schedule follow up appointment.    Future Appointments   Date Time Provider Department Center   3/26/2019 11:30 AM Nathan Villalobos MD [facility]        03/13/19 1539   Final Note   Assessment Type Final Discharge Note   Anticipated Discharge Disposition Home-Health   Hospital Follow Up  Appt(s) scheduled? (Neurosurgery clinic to schedule follow up appointment.)   Discharge plans and expectations educations in teach back method with documentation complete? Yes

## 2019-03-15 NOTE — PATIENT INSTRUCTIONS
Sepsis     To treat sepsis, antibiotics and fluids may by given through an intravenous (IV) line.     Sepsis happens when your body responds with widespread inflammation to a bad infection or bacteremia--the presence of bacteria in your bloodstream. Sepsis can be deadly. Blood pressure may drop and the lungs and kidneys may start to fail. Emergency care for sepsis is crucial.  Risk factors  Those most at risk for sepsis are:  · Infants or older adults  · People who have an illness that weakens their immune system, such as cancer, AIDS, or diabetes  · People being treated with chemotherapy medicines or radiation, which weakens the immune system  · People who have had a transplant  · People with a very severe infection such as pneumonia, meningitis, or a urinary tract infection  When to go to the emergency department (ED)  Sepsis is an emergency. Go to the nearest ED if you have a fever with any of these symptoms:  · Chills and shaking  · Rapid heartbeat and breathing  · Trouble breathing  · Severe nausea or uncontrolled vomiting  · Confusion, disorientation, drowsiness, or dizziness  · Decreased urination  · Severe pain, including in the back or joints   What to expect in the ED  · Blood and urine tests are done to look for bacteria. They also check for organ failure.  · Blood, urine, or sputum cultures may be taken. The samples are sent to a lab. They are placed in a special container. Any bacteria should grow in 24 hours.  · X-rays or other imaging tests may be done.  A person with sepsis will be admitted to the hospital and treated with antibiotics. Treatment may also include oxygen and intravenous fluids.  Date Last Reviewed: 10/1/2016  © 4599-9221 [employer]. [address] [place] [address], [place] [address]. All rights reserved. This information is not intended as a substitute for professional medical care. Always follow your healthcare professional's instructions.

## 2019-03-19 NOTE — PROGRESS NOTES
Physical Therapy Discharge Summary    Name: Jolene Velasquez  MRN: [id number]   Principal Problem: Brain mass     Patient Discharged from acute Physical Therapy on 3/13/19.  Please refer to prior PT noted date on 3/13/19 for functional status.     Assessment:     Patient appropriate for care in another setting.    Objective:     GOALS:   Multidisciplinary Problems     Physical Therapy Goals     Not on file          Multidisciplinary Problems (Resolved)        Problem: Physical Therapy Goal    Goal Priority Disciplines Outcome Goal Variances Interventions   Physical Therapy Goal   (Resolved)     PT, PT/OT Outcome(s) achieved     Description:  PT goals until 3/18/19    1. Pt sit to stand with RW with mod independent-not met  2. Pt to perform gait 200ft with RW with mod independent.-not met  3. Pt to go up/down curb step with RW with SBA.-not met                    Goals not met due to pt seen for eval only prior to D/C  Reasons for Discontinuation of Therapy Services  Transfer to alternate level of care.      Plan:     Patient Discharged to: Home with Home Health Service.    Fabiana Munoz, PT  3/19/2019

## 2019-03-25 NOTE — TELEPHONE ENCOUNTER
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    F/U for Path on Tuesday, 3/26.    Tara

## 2019-03-26 PROBLEM — C71.1: Status: ACTIVE | Noted: 2019-01-01

## 2019-03-26 NOTE — PROGRESS NOTES
Hematology and Medical Oncology   New Patient Consult     03/26/2019  Referred by:  Dr. Nathan Villalobos    Primary Oncologic Diagnosis: Glioblastoma Multiforme    History of Present Ilness:   Jolene Mata) is a pleasant 79 y.o.female who presents today for pathology discussion following her frontal craniotomy. The extended family is with her to discuss treatment options. She lives in the [facility] and wishes to receive therapy closer to home.    Oncology History:  --Presented to the ED on 03/08/2019 with a complaint of odd behavior such as leaving the door open and lights on and driving erratically. She received a workup which included and MRI brain that showed a right frontal brain tumor measuring 4.3 x 3.1 cm with invasion into corpus callosum, extensive surrounding vasogenic edema and leftward midline shift  --Right frontal bran tumor who is s/p minimally invasive right frontal craniotomy for resection of tumor on 03/11/2019 and presents today for her postoperative follow up  --Pathology confirmed Glioblastoma Multiforme (WHO grade IV) IDH-1 wild-type by immunohistochemistry      PAST MEDICAL HISTORY:   Past Medical History:   Diagnosis Date    Diabetes mellitus     Hypertension     Thyroid disease        PAST SURGICAL HISTORY:   Past Surgical History:   Procedure Laterality Date    CRANIOTOMY for tumor resection with stealth Right 3/11/2019    Performed by Nathan Villalobos MD at [facility] OR [address] [place] [address]       PAST SOCIAL HISTORY:   reports that she has never smoked. She has never used smokeless tobacco.    FAMILY HISTORY:  Family History   Problem Relation Age of Onset    Diabetes Mother        CURRENT MEDICATIONS:   Current Outpatient Medications   Medication Sig    dexamethasone (DECADRON) 2 MG tablet 1 tablet qhs    famotidine (PEPCID) 20 MG tablet Take 1 tablet (20 mg total) by mouth once daily.    HYDROcodone-acetaminophen (NORCO) 5-325 mg per tablet Take 1 tablet by mouth every 4 to 6 hours  as needed for Pain.    levETIRAcetam (KEPPRA) 500 MG Tab Take 1 tablet (500 mg total) by mouth 2 (two) times daily.    levothyroxine (SYNTHROID) 75 MCG tablet Take 75 mcg by mouth once daily.    lisinopril-hydrochlorothiazide (PRINZIDE,ZESTORETIC) 10-12.5 mg per tablet Take 1 tablet by mouth once daily.    metFORMIN (GLUCOPHAGE) 1000 MG tablet Take 1,000 mg by mouth 2 (two) times daily with meals.    metoprolol succinate (TOPROL-XL) 25 MG 24 hr tablet Take 1 tablet (25 mg total) by mouth every evening.     No current facility-administered medications for this visit.      ALLERGIES:   Review of patient's allergies indicates:  No Known Allergies      Review of Systems:     Review of Systems   Constitutional: Positive for fatigue. Negative for appetite change, chills, diaphoresis, fever and unexpected weight change.   HENT:   Negative for hearing loss, mouth sores, nosebleeds, sore throat, trouble swallowing and voice change.    Eyes: Negative for eye problems and icterus.   Respiratory: Negative for chest tightness, cough, hemoptysis, shortness of breath and wheezing.    Cardiovascular: Negative for chest pain, leg swelling and palpitations.   Gastrointestinal: Negative for abdominal distention, abdominal pain, blood in stool, diarrhea, nausea and vomiting.   Endocrine: Negative for hot flashes.   Genitourinary: Negative for bladder incontinence, difficulty urinating, dysuria and hematuria.    Musculoskeletal: Negative for arthralgias, back pain, flank pain, gait problem, myalgias, neck pain and neck stiffness.   Skin: Negative for itching, rash and wound.   Neurological: Positive for extremity weakness. Negative for dizziness, gait problem, headaches, numbness, seizures and speech difficulty.   Hematological: Negative for adenopathy. Does not bruise/bleed easily.   Psychiatric/Behavioral: Negative for confusion, depression and sleep disturbance. The patient is not nervous/anxious.           Physical Exam:    Vital signs have been reviewed    Physical Exam   Constitutional: She is oriented to person, place, and time. She appears well-developed and well-nourished. No distress.   Presents in a wheel chair   HENT:   Head: Normocephalic and atraumatic.   Mouth/Throat: Oropharynx is clear and moist. No oropharyngeal exudate.   Frontal incision healing nicely   Eyes: Pupils are equal, round, and reactive to light. Conjunctivae and EOM are normal.   Neck: Normal range of motion. Neck supple. No JVD present. No tracheal deviation present. No thyromegaly present.   Cardiovascular: Normal rate, regular rhythm and normal heart sounds. Exam reveals no friction rub.   No murmur heard.  Pulmonary/Chest: Effort normal and breath sounds normal. No stridor. No respiratory distress. She has no wheezes. She has no rales. She exhibits no tenderness.   Abdominal: Soft. Bowel sounds are normal. She exhibits no distension. There is no tenderness. There is no rebound and no guarding.   Musculoskeletal: Normal range of motion. She exhibits no edema, tenderness or deformity.   Lymphadenopathy:     She has no axillary adenopathy.   Neurological: She is alert and oriented to person, place, and time. She displays normal reflexes. A sensory deficit is present. No cranial nerve deficit. She exhibits normal muscle tone. Coordination normal.   Skin: Skin is warm and dry. Capillary refill takes less than 2 seconds. No rash noted. She is not diaphoretic. No erythema. No pallor.   Psychiatric: She has a normal mood and affect. Her behavior is normal. Judgment and thought content normal.       ECOG Performance Status: (foot note - ECOG PS provided by Eastern Cooperative Oncology Group) 2 - Symptomatic, <50% confined to bed    Karnofsky Performance Score:  80%- Normal Activity with Effort: Some Symptoms of Disease    Labs:   Lab Results   Component Value Date    WBC 15.79 (H) 03/13/2019    HGB 10.6 (L) 03/13/2019    HCT 33.6 (L) 03/13/2019      03/13/2019    ALT 42 03/13/2019    AST 20 03/13/2019     03/13/2019    K 4.8 03/13/2019     03/13/2019    CREATININE 1.20 03/29/2019    BUN 50 (H) 03/29/2019    CO2 26 03/13/2019    INR 1.0 03/11/2019    HGBA1C 6.2 (H) 03/11/2019       Imaging: Previous imaging has been reviewed   Assessment and Plan:     Ms. Velasquez is a 79 year old female with a newly identified GBM    Glioblastoma Multiforme  ----Options for therapy were given. The considerations included concominant temozolamide 75mg/m2 PO day x 3 weeks with radiation or x 5 days.  --We discussed that the addition of temozolomide increases PFS and OS by 2 months   --MGMT promoter is pending today. If hypermethylated, it is associated with a survival advantage, improved response to Tem/XRT. Median survival increase of 6 months and risk of death reduced by 55% ( [facility] 2005)  --Will see radiation oncology for XRT planning immediately after this visit  --Discussed the use of optune and it's category 1 recommendation following concurrent therapy  --Bactrim DS Monday/Wednesday/Friday while on temoday  --Continue steroid taper as prescribed by Dr. Villalobos  --We will assist in coordination of care in the [place] area    60 minutes were spent face to face with the patient and her  family to discuss the disease, natural history, treatment options and survival statistics. I have provided the patient with an opportunity to ask questions and have all questions answered to her satisfaction.       She will return to clinic at the same time she sees Dr. Villalobos for follow up imaging, but knows to call in the interim if symptoms change or should a problem arise.        Myla Gonzalez MD  Hematology and Medical Oncology  Bone Marrow Transplant  [facility]

## 2019-03-26 NOTE — TELEPHONE ENCOUNTER
Rec'd a call from Kamilla that pt needed Decadron Rx that was discussed with her at appt today.    Called pharmacy. Pt had 2 refills but on the Rx with taper instructions. Gave verbal for  Dex 2mg, disp 30, refill 2, sig: take 1 tb po daily x 7 days then stop.

## 2019-03-26 NOTE — PROGRESS NOTES
2019    Radiation Oncology Consultation    Assessment   This is a 79 y.o. y/o female with Right frontal GBM (IDH1-wild type; MGMT pending) s/p maximal safe resection by Dr. Villalobos 3/11/19. She is referred for consideration of adjuvant therapy.     I discussed the natural history of GBM and treatment options available. I presented options ranging from most aggressive/standard of care through supportive includin) Chemo-RT to 60 Gy in 30 fractions w/ Temodar, 2) Hypofractionated chemo-RT to 40 Gy in 15 fractions w/ Temodar, 3) Hypofractionated RT 25-30 Gy in 5 fractions alone. If MGMT status returns hypermethylated, then Temodar alone would be a reasonable option as well.     Given her performance status and age, I recommend hypofractionated RT to 40.05 Gy in 15 fx w/ concurrent Temodar. Potential side effects of radiation therapy were reviewed. She wishes to receive treatment closer to home, so I will make referral to a radiation facility in [place].        Plan   1) Patient referred to [facility] in [place].        Chief Complaint   Patient presents with    GBM     consult       HPI: Mrs. Velasquez is a 80 y/o female who presented to [facility] ED for 1 month h/o decreased attention and odd behavior per her family. MRI Brain demonstrated a 4.3 cm Right frontal mass with invasion of the corpus callosum and central necrosis. On 3/11/19 she underwent maximal safe resection by Dr. Villalobos that revealed WHO Grade IV GBM (IDH1-wt; MGMT methylation status pending). She presents today to discuss adjuvant therapy.     In clinic today, the patient is accompanied by multiple family and friends. They feel that her energy and attention improved after surgery, but they have noticed a return to her pre-surgery slowed or delayed thinking. She denies HA, N/V, focal numbness or weakness, seizures, or visual change. She is currently in a wheelchair but reports ambulating with walker at home.       Prior Radiation  History: None    Past Medical History:   Diagnosis Date    Diabetes mellitus     Hypertension     Thyroid disease        Past Surgical History:   Procedure Laterality Date    CRANIOTOMY for tumor resection with stealth Right 3/11/2019    Performed by Nathan Villalobos MD at [facility] OR [address] [place] [address]       Social History     Tobacco Use    Smoking status: Never Smoker    Smokeless tobacco: Never Used   Substance Use Topics    Alcohol use: Not on file    Drug use: Not on file       Cancer-related family history is not on file.    Current Outpatient Medications on File Prior to Visit   Medication Sig Dispense Refill    dexamethasone (DECADRON) 2 MG tablet 1 tablet qhs 30 tablet 2    famotidine (PEPCID) 20 MG tablet Take 1 tablet (20 mg total) by mouth once daily. 30 tablet 2    HYDROcodone-acetaminophen (NORCO) 5-325 mg per tablet Take 1 tablet by mouth every 4 to 6 hours as needed for Pain. 50 tablet 0    levETIRAcetam (KEPPRA) 500 MG Tab Take 1 tablet (500 mg total) by mouth 2 (two) times daily. 60 tablet 2    levothyroxine (SYNTHROID) 75 MCG tablet Take 75 mcg by mouth once daily.      lisinopril-hydrochlorothiazide (PRINZIDE,ZESTORETIC) 10-12.5 mg per tablet Take 1 tablet by mouth once daily.      metFORMIN (GLUCOPHAGE) 1000 MG tablet Take 1,000 mg by mouth 2 (two) times daily with meals.      metoprolol succinate (TOPROL-XL) 25 MG 24 hr tablet Take 1 tablet (25 mg total) by mouth every evening.      rivaroxaban (XARELTO) 20 mg Tab Take 20 mg by mouth daily with dinner or evening meal.      [DISCONTINUED] dexamethasone (DECADRON) 2 MG tablet Take 4mg q 8h for 3 days, then 4mg q 12h for 3 days, then 2mg q 8h for 3 days, then 2mg q 12h and continue this dose until seen by Dr. Villalobos. 45 tablet 2     No current facility-administered medications on file prior to visit.        Review of patient's allergies indicates:  No Known Allergies    Review of Systems   Constitutional: Negative for fever and weight loss.  "  HENT: Negative for ear pain and sore throat.    Eyes: Negative for blurred vision and double vision.   Respiratory: Negative for cough, hemoptysis and shortness of breath.    Cardiovascular: Negative for chest pain and leg swelling.   Gastrointestinal: Positive for diarrhea. Negative for abdominal pain, constipation, heartburn and nausea.   Genitourinary: Negative for dysuria and hematuria.   Musculoskeletal: Negative for falls and joint pain.   Neurological: Negative for tingling, speech change, focal weakness, seizures and headaches.   Psychiatric/Behavioral: Negative for depression. The patient is not nervous/anxious.         Vital Signs: /68   Pulse 68   Resp 18   Ht 5' 2" (1.575 m)   Wt 83 kg (183 lb)   BMI 33.47 kg/m²     ECOG Performance Status: 2 - Ambulates, capable of self care only    Physical Exam   Constitutional: She is oriented to person, place, and time and well-developed, well-nourished, and in no distress.   HENT:   Head: Normocephalic and atraumatic.   Mouth/Throat: Oropharynx is clear and moist.   Eyes: EOM are normal. No scleral icterus.   Neck: Normal range of motion. Neck supple.   Pulmonary/Chest: No accessory muscle usage. No respiratory distress.   Abdominal: Soft. Normal appearance. She exhibits no distension.   Musculoskeletal: Normal range of motion. She exhibits no edema.   Lymphadenopathy:     She has no cervical adenopathy.        Right: No supraclavicular adenopathy present.        Left: No supraclavicular adenopathy present.   Neurological: She is alert and oriented to person, place, and time. She has normal motor skills. No cranial nerve deficit.   Skin: Skin is warm and dry.   Psychiatric: Mood and judgment normal. She is apathetic.   Vitals reviewed.       Labs:    Imaging: I have personally reviewed the patient's available images and reports and summarized pertinent findings above in HPI.     Pathology: I have personally reviewed the patient's available pathology " and summarized pertinent findings above in HPI.       - Thank you for allowing me to participate in the care of your patient.    Guerrero Craven MD, PhD

## 2019-03-26 NOTE — PROGRESS NOTES
Subjective:   I, Janes Haile, attest that this documentation has been prepared under the direction and in the presence of Nathan Villalobos MD.     Patient ID: Jolene Velasquez is a 79 y.o. female     Chief Complaint: Post-op Evaluation      HPI  Ms. Jolene Velasquez is a pleasant 79 y.o. woman with a recently discovered right frontal bran tumor who is s/p minimally invasive right frontal craniotomy for resection of tumor on 03/11/2019 and presents today for her postoperative follow up. The pt presented to the ED on 03/08/2019 with a complaint of odd behavior such as leaving the door open and lights on and driving erratically. She received a workup which included and MRI brain that showed a right frontal brain tumor measuring 4.3 x 3.1 cm with invasion into corpus callosum, extensive surrounding vasogenic edema and leftward midline shift.  We elected to resect as indicated above.     Pt presents today in a wheelchair and is accompanied by her family and friends. They report that she is slow to react and intermittently absent-minded. She also mistakenly skipped a portion of the Decadron taper before discontinuing. She states she has done well postoperatively and has no complaints at this time.     Review of Systems   Constitutional: Positive for activity change (decreased). Negative for fatigue, fever and unexpected weight change.   HENT: Negative for facial swelling.    Eyes: Negative.    Respiratory: Negative.    Cardiovascular: Negative.    Gastrointestinal: Negative for diarrhea, nausea and vomiting.   Genitourinary: Negative.    Musculoskeletal: Negative for back pain, joint swelling, myalgias and neck pain.   Neurological: Negative for dizziness, weakness, numbness and headaches.        Positive for intermittently confused/absentminded.   Psychiatric/Behavioral: Negative.       Past Medical History:   Diagnosis Date    Diabetes mellitus     Hypertension     Thyroid disease        Objective:      Vitals:     03/26/19 1126   BP: 120/68   Pulse: 68      Physical Exam   Constitutional: She is oriented to person, place, and time. She appears well-developed and well-nourished.   HENT:   Head: Normocephalic and atraumatic.   Neck: Neck supple.   Neurological: She is alert and oriented to person, place, and time. No cranial nerve deficit. She displays a negative Romberg sign. GCS eye subscore is 4. GCS verbal subscore is 5. GCS motor subscore is 6.          IMAGING: MRI BRAIN W WO CONTRAST (03/12/2019) shows stable postoperative changes from minimally invasive right frontal craniotomy for resection of tumor with associated edema about the resection cavity.    Pathology: Glioblastoma Multiforme     I have personally reviewed the images with the pt.      I, Dr. Nathan Villalobos, personally performed the services described in this documentation. All medical record entries made by the scribe, Janes Haile, were at my direction and in my presence.  I have reviewed the chart and agree that the record reflects my personal performance and is accurate and complete. Nathan Villalobos MD. 03/26/2019    Assessment:       Glioblastoma.     Plan:   I have personally reviewed the MRI brain with the pt which shows stable postoperative changes from minimally invasive right frontal craniotomy for resection of tumor with associated edema about the resection cavity.    Recommended treatment includes: Radiation therapy with concomitant chemotherapy, and Optune. I will refer the pt to radiation oncologist, Dr.Clayton Craven; and Hematologist/ Oncologist, Dr. Gonzalez.   Jaquelin Duenas will discuss the logistics of the optune device today.    I will schedule the pt for a 3 month follow up with repeat MRI brain.    Pt is not cleared to drive at this time, she voiced understanding.     Decadron: Take a 2 mg tablet for one week then stop.    This patient's treatment plan is consistent with the NCCN guidelines.

## 2019-03-26 NOTE — LETTER
April 2, 2019      Nathan Villalobos MD  [address]  [facility] [address]           [facility] Hematology Oncology  [address]  [facility] [address]  Phone: [phone]          Patient: Jolene Velasquez   MR Number: [id number]   YOB: 1940   Date of Visit: 3/26/2019       Dear Dr. Nathan Villalobos:    Thank you for referring Jolene Velasquez to me for evaluation. Attached you will find relevant portions of my assessment and plan of care.    If you have questions, please do not hesitate to call me. I look forward to following Jolene Velasquez along with you.    Sincerely,    Myla Gonzalez MD    Enclosure  CC:  No Recipients    If you would like to receive this communication electronically, please contact [email or web address] or [phone] to request more information on [email or web address] Link access.    For providers and/or their staff who would like to refer a patient to [facility], please contact us through our one-stop-shop provider referral line, [facility], at [phone].    If you feel you have received this communication in error or would no longer like to receive these types of communications, please e-mail [email or web address]

## 2019-03-26 NOTE — LETTER
March 26, 2019      Nathan Villalobos MD  [address]  [place] [address]           [facility] - Radiation Oncology  [address]  [place] [address]  Phone: [phone]          Patient: Jolene Velasquez   MR Number: [id number]   YOB: 1940   Date of Visit: 3/26/2019       Dear Dr. Nathan Villalobos:    Thank you for referring Jolene Velasquez to me for evaluation. Attached you will find relevant portions of my assessment and plan of care.    If you have questions, please do not hesitate to call me. I look forward to following Jolene Velasquez along with you.    Sincerely,    Guerrero Craven MD    Enclosure  CC:  No Recipients    If you would like to receive this communication electronically, please contact [email or web address] or [phone] to request more information on [facility] access.    For providers and/or their staff who would like to refer a patient to [facility], please contact us through our one-stop-shop provider referral line, [facility], at [phone].    If you feel you have received this communication in error or would no longer like to receive these types of communications, please e-mail [email or web address]

## 2019-03-26 NOTE — PATIENT INSTRUCTIONS
I have personally reviewed the MRI brain with the pt which shows stable postoperative changes from minimally invasive right frontal craniotomy for resection of tumor with associated edema about the resection cavity.    Recommended treatment includes: Radiation therapy with concomitant chemotherapy, and Optune. I will refer the pt to radiation oncologist, Dr.Clayton Craven, and Hematologist/ Oncologist, Dr. Gonzalez.   Jaquelin Duenas will discuss the logistics of the optune device today.    I will schedule the pt for a 3 month follow up with repeat MRI brain.    Pt is not cleared to drive at this time, she voiced understanding.     Decadron: Take a 2 mg tablet for one week then stop.    This patient's treatment plan is consistent with the NCCN guidelines

## 2019-03-28 NOTE — PHYSICIAN QUERY
PT Name: Jolene Velasquez  MR #: [id number]    Physician Query Form - Pathology Findings Clarification     CDS: Misti Zimmer RN, CCDS       Contact information: [email or web address]  This form is a permanent document in the medical record.     Query Date: March 28, 2019      By submitting this query, we are merely seeking further clarification of documentation.  Please utilize your independent clinical judgment when addressing the question(s) below.      The medical record contains the following:     Findings Supporting Clinical Information Location in Medical Record   Glioblastoma FINAL PATHOLOGIC DIAGNOSIS  [facility] DIAGNOSIS:  BRAIN, TUMOR, RESECTION ([id number]; [facility]; [place], [place]; COLLECTED  3/11/19): Glioblastoma (WHO grade IV) IDH-1 wild-type by immunohistochemistry.    POSTOPERATIVE DIAGNOSES:  1.  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  PROCEDURES PERFORMED:  1.  Minimally invasive right frontal craniotomy for resection of tumor.    PRINCIPAL PROBLEM:  Brain mass  3/11-Pathology (Final result 3/27/2019 @ 0907)            3/11-Op Note            3/13-D/C Summary     Please document the clinical significance of the Pathologists findings of ____Glioblastoma_____.    [ x  ] I agree with the Pathology Findings   [   ] I do not agree with the Pathology Findings   [   ] Other/Clarification of Findings:   [  ] Clinically Undetermined       Please document in your progress notes daily for the duration of treatment until resolved and include in your discharge summary.

## 2019-03-28 NOTE — PHYSICIAN QUERY
PT Name: Jolene Velasquez  MR #: [id number]     Physician Query Form - Documentation Clarification      CDS: Misti Zimmer RN, CCDS       Contact information: [email or web address]    This form is a permanent document in the medical record.     Query Date: March 28, 2019    By submitting this query, we are merely seeking further clarification of documentation. Please utilize your independent clinical judgment when addressing the question(s) below.    The Medical record reflects the following:    Supporting Clinical Findings Location in Medical Record     4.3 x 3.4 cm enhancing necrotic mass at the paramidline region of the right frontal lobe with invasion of the corpus callosum, extensive right frontal lobe vasogenic edema and an approximately 6 mm leftward midline shift.     3/8-MRI Brain     Brain mass  Now s/p crani for tumor resection on 3/11.  -Patient neurologically stable on exam  -Continue Keppra for seizure prevention  -Begin Decadron taper to 2 mg BID cerebral edema. Continue Famotidine for PPI.      3/13-Neurosurgery PN                                                                            Doctor, Please specify diagnosis or diagnoses associated with above clinical findings.    Provider Use Only     [ x  ] Brain Compression     [   ] Other diagnosis (please specify)__________________________     [   ] Clinically Insignificant                                                                                                          [  ] Clinically Undetermined

## 2019-04-04 NOTE — TELEPHONE ENCOUNTER
----- Message from Duy Rodriguez sent at 4/4/2019  2:24 PM CDT -----  Contact: [facility] @ [phone]   Caller requesting a return call regarding the additional information to process the request on the (collagenase (SANTYL) ointment )

## 2019-04-04 NOTE — TELEPHONE ENCOUNTER
----- Message from Fracisco Stringer sent at 4/4/2019 12:50 PM CDT -----  Contact: Alyx ([facility]) [phone]  Needs Advice    Reason for call: Alyx would like to speak to someone regarding the patient's incision        Communication Preference:PHONE     Additional Information:

## 2019-04-04 NOTE — TELEPHONE ENCOUNTER
Spoke with pt's caregiver. She reports that a few days ago, pt began to develop erythema and clear drainage around pt's surgical incision.     Caregiver sent picture of the wound, which was reviewed by Lindsay Pelletier PA-C. Pt has been prescribed 10 day regimen of Bactrim -160 mg BID along with Santyl ointment to apply ONLY to the scab itself until it has been debrided. Caregiver will then apply Medihoney to site. Reviewed instructions in detail with caregiver. She v/u. Pt will follow-up in 1 week w/ Tara Rodriguez RN in NSGY clinic. Caregiver will call back if she has any questions or concerns in the meantime.

## 2019-04-11 NOTE — TELEPHONE ENCOUNTER
----- Message from Kevin Sheikh sent at 4/11/2019 11:39 AM CDT -----  Needs Advice    Reason for call: Alyx is calling to discuss getting order for aid/hospice        Communication Preference: Alyx pike/ [facility] @ [phone] (ask for Alyx or Irena)    Additional Information:

## 2019-04-12 PROBLEM — E87.5 HYPERKALEMIA: Status: ACTIVE | Noted: 2019-01-01

## 2019-04-12 PROBLEM — E87.20 METABOLIC ACIDOSIS: Status: ACTIVE | Noted: 2019-01-01

## 2019-04-12 PROBLEM — I48.0 PAF (PAROXYSMAL ATRIAL FIBRILLATION): Status: ACTIVE | Noted: 2019-01-01

## 2019-04-12 PROBLEM — N17.9 ACUTE RENAL FAILURE: Status: ACTIVE | Noted: 2019-01-01

## 2019-04-13 PROBLEM — N18.9 CHRONIC KIDNEY DISEASE-MINERAL AND BONE DISORDER: Status: ACTIVE | Noted: 2019-01-01

## 2019-04-13 PROBLEM — I12.9 HYPERTENSIVE KIDNEY DISEASE WITH CHRONIC KIDNEY DISEASE STAGE III: Status: ACTIVE | Noted: 2019-01-01

## 2019-04-13 PROBLEM — N18.30 HYPERTENSIVE KIDNEY DISEASE WITH CHRONIC KIDNEY DISEASE STAGE III: Status: ACTIVE | Noted: 2019-01-01

## 2019-04-13 PROBLEM — E83.9 CHRONIC KIDNEY DISEASE-MINERAL AND BONE DISORDER: Status: ACTIVE | Noted: 2019-01-01

## 2019-04-13 PROBLEM — N17.9 AKI (ACUTE KIDNEY INJURY): Status: ACTIVE | Noted: 2019-01-01

## 2019-04-13 PROBLEM — E63.9 INADEQUATE DIETARY ENERGY INTAKE: Status: ACTIVE | Noted: 2019-01-01

## 2019-04-13 PROBLEM — M89.9 CHRONIC KIDNEY DISEASE-MINERAL AND BONE DISORDER: Status: ACTIVE | Noted: 2019-01-01

## 2019-04-15 PROBLEM — G93.6 VASOGENIC CEREBRAL EDEMA: Status: ACTIVE | Noted: 2019-01-01

## 2019-04-16 NOTE — TELEPHONE ENCOUNTER
[place] Cortez Dave [place] [facility] [place] a call back from Dr. Nathan Villalobos @ [phone] about tumor regrowth.       Reason for Disposition   [1] Caller requests to speak ONLY to PCP AND [2] NON-URGENT question    Protocols used: ST PCP CALL - NO TRIAGE-A-

## 2019-04-16 NOTE — TELEPHONE ENCOUNTER
Returned Dr Dave's call. Explained that Dr Villalobos was [place]. He understood completely.    States he and the other specialists have been speaking with the family and the family as chosen hospice. Gave me Nora's #. I will call on behalf of Dr Villalobos.    Called Nora. Explained  Villalobos was out of town and that from a neurosurgical standpoint, resection of additional tumor would be too dangerous due to her current physical condition. He v/u.    Encouraged to call us with any questions or concerns.    ----- Message from Duy Rodriguez sent at 4/16/2019  9:07 AM CDT -----  Contact: Dr Dave @ [phone] ( cell )   Caller requesting a return call about a recurring condition, caller would like Dr Villalobos to  review the current imagines, the family is leaning toward Hospice and would like Dr Villalobos opinion, pls call to advise

## 2019-04-29 NOTE — PROGRESS NOTES
Per Dr. Villalobos, I spoke to patient about starting Optune treatment. Patients family and friends were present.  I advised patient of treatment plan and possible side effects. Patient agreed to Optune treatment, and Rx was written to initiate treatment. Patient will be contacted by [employer] rep to do an in-home education on product use. She will begin Optune therapy after completion of radiation therapy.  She can continue other cancer treatment while using Optune. Patient was advised to contact me if she has any questions or concerns. Patient understood and agreed to all information given.

## 2022-04-27 NOTE — ASSESSMENT & PLAN NOTE
SBP goal 100-140  - Hold home ACEI/HCTZ combo in immediate post-op setting  - Continue home metoprolol  - PRN IV hydralazine    Metronidazole Pregnancy And Lactation Text: This medication is Pregnancy Category B and considered safe during pregnancy.  It is also excreted in breast milk.

## 2023-01-01 NOTE — ED NOTES
Neuro ICU and Neurosurgery at bedside.    Female infant born at 38 weeks to a 21 year old  mother via . Maternal history non-pertinent. Pregnancy course uncomplicated.  Maternal blood type A+. GBS negative, HBsAg negative, HIV negative; treponema non-reactive & Rubella immune. COVID-19 swab negative.     Delivery uncomplicated. APGAR 9 & 9 at 1 & 5 minutes respectively. Birth weight 2720 g. Erythromycin eye drops and vitamin K given.     Head Circumference (cm): 32 (02 Mar 2023 06:36)    Glucose: CAPILLARY BLOOD GLUCOSE        Vital Signs Last 24 Hrs  T(C): 36.3 (02 Mar 2023 09:00), Max: 36.9 (02 Mar 2023 04:11)  T(F): 97.3 (02 Mar 2023 09:00), Max: 98.4 (02 Mar 2023 04:11)  HR: 136 (02 Mar 2023 09:00) (136 - 168)  RR: 40 (02 Mar 2023 09:00) (40 - 52)    Parameters below as of 02 Mar 2023 09:00  Patient On (Oxygen Delivery Method): room air        Physical Exam  General: no acute distress, well appearing  Head: anterior fontanel open and flat  Eyes: Globes present b/l; no scleral icterus  Ears/Nose: patent w/ no deformities  Mouth/Throat: no cleft lip or palate   Neck: no masses or lesion, no clavicular crepitus  Cardiovascular: S1 & S2, no significant murmurs, femoral pulses 2+ B/L  Respiratory: Lungs clear to auscultation bilaterally, no wheezing, rales or rhonchi; no retractions  Abdomen: soft, non-distended, BS +, no masses, no organomegaly, umbilical cord stump attached  Genitourinary: normal david 1 external genitalia  Anus: patent   Back: no significant sacral dimple or tags  Musculoskeletal: moving all extremities, Ortolani/Santoyo negative  Skin: no significant lesions, no significant jaundice  Neurological: reactive; suck, grasp, jens & Babinski reflexes +

## 2023-04-21 NOTE — ASSESSMENT & PLAN NOTE
79 year old female with a PMHx hypothyroidism, DM, and HTN, who presents to the ED for evaluation of a brain mass. Now s/p crani for tumor resection on 3/11.    -Patient neurologically stable on exam  -Continue Keppra for seizure prevention  -Begin Decadron taper to 2 mg BID cerebral edema. Continue Famotidine for PPI.   -Leukocytosis: Likely 2/2 steroids and recent procedure. Patient afebrile. Will check UA.   -DM: DC on Metformin  -HTN: Continue home medications  -OK to restart your Xarelto on 3/25/19  -FU with Dr. Villalobos in 2 weeks for wound check and path results  -Discharge instructions given verbally to patient and family. All of their questions were answered. They were encouraged to call the clinic with any questions or concerns prior to follow up appt.          yes

## 2023-11-27 NOTE — ASSESSMENT & PLAN NOTE
POD1 s/p craniotomy for resection of R frontal mass  - Admit to NCC for post-op monitoring  - SBP goal <140  - Dexamethasone 4 mg q6h  - Keppra seizure ppx  - HOB 30   - Pain control with PRN norco  - Post-op MRI complete  - PT/OT/SLP  - Unclear why patient on full AC at home, however will hold in immediate post-op setting.   - transfer to [facility] today   Denies h/o or family h/o DVT, PE  Denies bleeding or clotting disorders  Denies dentures/partials, loose teeth/caps

## (undated) DEVICE — PINS SKULL ADULT MAYFIELD
Type: IMPLANTABLE DEVICE | Site: CRANIAL | Status: NON-FUNCTIONAL
Removed: 2019-03-11

## (undated) DEVICE — BURR ACORN 7.5MM

## (undated) DEVICE — TRAY FOLEY 16FR INFECTION CONT

## (undated) DEVICE — ELECTRODE REM PLYHSV RETURN 9

## (undated) DEVICE — HOOK LONE STAR BLUNT 12MM

## (undated) DEVICE — CORD BIPOLAR 12 FOOT

## (undated) DEVICE — BURR ROUTER TAPERED

## (undated) DEVICE — MARKERS SPHERZ PASSIVE

## (undated) DEVICE — DRAPE OPMI STERILE

## (undated) DEVICE — DRESSING SURGICAL 1X1

## (undated) DEVICE — SUT VICRYL PLUS 3-0 SH 18IN

## (undated) DEVICE — KIT FIBRIN SEALANT EVICEL 5 ML

## (undated) DEVICE — DURAPREP SURG SCRUB 26ML

## (undated) DEVICE — FORCEP SPETZLER MALIS 8IN 1MM

## (undated) DEVICE — DRAPE SURGICAL STERI IRRG PCH

## (undated) DEVICE — SPRAY MASTISOL

## (undated) DEVICE — COTTON BALLS 1/2IN

## (undated) DEVICE — ELECTRODE BLADE INSULATED 1 IN

## (undated) DEVICE — ROUTER TAPERED 2.3MM

## (undated) DEVICE — DIFFUSER

## (undated) DEVICE — DRESSING SURGICAL 1/2X1/2

## (undated) DEVICE — TAPE SURG MEDIPORE 6X72IN

## (undated) DEVICE — MARKER SKIN STND TIP BLUE BARR

## (undated) DEVICE — STAPLER SKIN PROXIMATE WIDE

## (undated) DEVICE — SUT 4/0 18IN NUROLON BLK B

## (undated) DEVICE — SEE MEDLINE ITEM 146292

## (undated) DEVICE — SUT D SPECIAL VICRYL 2-0

## (undated) DEVICE — CARTRIDGE OIL

## (undated) DEVICE — SPONGE NEURO 1/4X1/4

## (undated) DEVICE — ACCESS SYS BRAIN 21X15X5

## (undated) DEVICE — DRESSING TELFA STRL 4X3 LF

## (undated) DEVICE — SEE MEDLINE ITEM 157131

## (undated) DEVICE — DRESSING SURGICAL 1X3

## (undated) DEVICE — SEE MEDLINE ITEM 156905

## (undated) DEVICE — TUBE FRAZIER 5MM 2FT SOFT TIP

## (undated) DEVICE — DRAPE THYROID WITH ARMBOARD